# Patient Record
Sex: FEMALE | Race: WHITE
[De-identification: names, ages, dates, MRNs, and addresses within clinical notes are randomized per-mention and may not be internally consistent; named-entity substitution may affect disease eponyms.]

---

## 2017-02-07 ENCOUNTER — HOSPITAL ENCOUNTER (OUTPATIENT)
Dept: HOSPITAL 62 - RAD | Age: 72
End: 2017-02-07
Attending: NURSE PRACTITIONER
Payer: MEDICARE

## 2017-02-07 DIAGNOSIS — R10.9: Primary | ICD-10-CM

## 2017-02-07 PROCEDURE — 76700 US EXAM ABDOM COMPLETE: CPT

## 2017-02-16 ENCOUNTER — HOSPITAL ENCOUNTER (INPATIENT)
Dept: HOSPITAL 62 - ER | Age: 72
LOS: 8 days | Discharge: NURSING, REHAB OR LONG-TERM CARE | DRG: 543 | End: 2017-02-24
Attending: INTERNAL MEDICINE | Admitting: INTERNAL MEDICINE
Payer: MEDICARE

## 2017-02-16 DIAGNOSIS — M54.5: ICD-10-CM

## 2017-02-16 DIAGNOSIS — E78.5: ICD-10-CM

## 2017-02-16 DIAGNOSIS — I25.10: ICD-10-CM
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
## 2017-02-16 DIAGNOSIS — Z86.711: ICD-10-CM

## 2017-02-16 DIAGNOSIS — K21.9: ICD-10-CM

## 2017-02-16 DIAGNOSIS — G89.4: ICD-10-CM

## 2017-02-16 DIAGNOSIS — F11.90: ICD-10-CM
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
## 2017-02-16 DIAGNOSIS — I82.502: ICD-10-CM

## 2017-02-16 DIAGNOSIS — R58: ICD-10-CM

## 2017-02-16 DIAGNOSIS — I10: ICD-10-CM

## 2017-02-16 DIAGNOSIS — Z88.0: ICD-10-CM

## 2017-02-16 DIAGNOSIS — M84.48XA: Primary | ICD-10-CM

## 2017-02-16 DIAGNOSIS — Z79.01: ICD-10-CM

## 2017-02-16 DIAGNOSIS — E11.9: ICD-10-CM

## 2017-02-16 LAB
ALBUMIN SERPL-MCNC: 3.9 G/DL (ref 3.5–5)
ALP SERPL-CCNC: 68 U/L (ref 38–126)
ALT SERPL-CCNC: 24 U/L (ref 9–52)
ANION GAP SERPL CALC-SCNC: 9 MMOL/L (ref 5–19)
APTT BLD: 86.4 SEC (ref 23.5–35.8)
AST SERPL-CCNC: 31 U/L (ref 14–36)
BASOPHILS # BLD AUTO: 0.1 10^3/UL (ref 0–0.2)
BASOPHILS NFR BLD AUTO: 0.9 % (ref 0–2)
BILIRUB DIRECT SERPL-MCNC: 0 MG/DL (ref 0–0.3)
BILIRUB SERPL-MCNC: 0.6 MG/DL (ref 0.2–1.3)
BUN SERPL-MCNC: 17 MG/DL (ref 7–20)
CALCIUM: 9.3 MG/DL (ref 8.4–10.2)
CHLORIDE SERPL-SCNC: 100 MMOL/L (ref 98–107)
CO2 SERPL-SCNC: 33 MMOL/L (ref 22–30)
CREAT SERPL-MCNC: 0.74 MG/DL (ref 0.52–1.25)
EOSINOPHIL # BLD AUTO: 0.2 10^3/UL (ref 0–0.6)
EOSINOPHIL NFR BLD AUTO: 2.2 % (ref 0–6)
ERYTHROCYTE [DISTWIDTH] IN BLOOD BY AUTOMATED COUNT: 16 % (ref 11.5–14)
GLUCOSE SERPL-MCNC: 177 MG/DL (ref 75–110)
HCT VFR BLD CALC: 36.7 % (ref 36–47)
HGB BLD-MCNC: 12.3 G/DL (ref 12–15.5)
HGB HCT DIFFERENCE: 0.2
LYMPHOCYTES # BLD AUTO: 1.8 10^3/UL (ref 0.5–4.7)
LYMPHOCYTES NFR BLD AUTO: 19.5 % (ref 13–45)
MCH RBC QN AUTO: 31.4 PG (ref 27–33.4)
MCHC RBC AUTO-ENTMCNC: 33.5 G/DL (ref 32–36)
MCV RBC AUTO: 94 FL (ref 80–97)
MONOCYTES # BLD AUTO: 0.9 10^3/UL (ref 0.1–1.4)
MONOCYTES NFR BLD AUTO: 9.7 % (ref 3–13)
NEUTROPHILS # BLD AUTO: 6.4 10^3/UL (ref 1.7–8.2)
NEUTS SEG NFR BLD AUTO: 67.7 % (ref 42–78)
POTASSIUM SERPL-SCNC: 3.5 MMOL/L (ref 3.6–5)
PROT SERPL-MCNC: 6.2 G/DL (ref 6.3–8.2)
PROTHROMBIN TIME: 62.6 SEC (ref 11.4–15.4)
PROTHROMBIN TIME: 67.4 SEC (ref 11.4–15.4)
RBC # BLD AUTO: 3.92 10^6/UL (ref 3.72–5.28)
SODIUM SERPL-SCNC: 142 MMOL/L (ref 137–145)
WBC # BLD AUTO: 9.4 10^3/UL (ref 4–10.5)

## 2017-02-16 PROCEDURE — 85027 COMPLETE CBC AUTOMATED: CPT

## 2017-02-16 PROCEDURE — 85730 THROMBOPLASTIN TIME PARTIAL: CPT

## 2017-02-16 PROCEDURE — 99285 EMERGENCY DEPT VISIT HI MDM: CPT

## 2017-02-16 PROCEDURE — 85610 PROTHROMBIN TIME: CPT

## 2017-02-16 PROCEDURE — 80053 COMPREHEN METABOLIC PANEL: CPT

## 2017-02-16 PROCEDURE — 83735 ASSAY OF MAGNESIUM: CPT

## 2017-02-16 PROCEDURE — 82272 OCCULT BLD FECES 1-3 TESTS: CPT

## 2017-02-16 PROCEDURE — 72190 X-RAY EXAM OF PELVIS: CPT

## 2017-02-16 PROCEDURE — 82962 GLUCOSE BLOOD TEST: CPT

## 2017-02-16 PROCEDURE — 96374 THER/PROPH/DIAG INJ IV PUSH: CPT

## 2017-02-16 PROCEDURE — 85025 COMPLETE CBC W/AUTO DIFF WBC: CPT

## 2017-02-16 PROCEDURE — 36415 COLL VENOUS BLD VENIPUNCTURE: CPT

## 2017-02-16 PROCEDURE — 81001 URINALYSIS AUTO W/SCOPE: CPT

## 2017-02-16 PROCEDURE — 96375 TX/PRO/DX INJ NEW DRUG ADDON: CPT

## 2017-02-16 PROCEDURE — 72148 MRI LUMBAR SPINE W/O DYE: CPT

## 2017-02-16 RX ADMIN — POTASSIUM CHLORIDE SCH MEQ: 750 TABLET, FILM COATED, EXTENDED RELEASE ORAL at 20:51

## 2017-02-16 NOTE — ER DOCUMENT REPORT
ED Neck/Back Problem





- General


Chief Complaint: Low Back Pain


Stated Complaint: BACK PAIN


Time seen by provider: 10:55


Mode of Arrival: Medic


Information source: Patient, Emergency Med Personnel, [facility] Records


Notes: 


This 71-year-old female patient comes emergency room complaining of sacral low 

back pain radiating down the bilateral thighs.


She does have chronic low back pain and takes Norco 7.5 TID.  She last filled a 

one-month prescription 3 days ago.


She reports 2 weeks ago she fell against a wall striking it with her buttock 

area.  It was a little uncomfortable but not too much of a problem at that time.





3-4 days ago she reports her back "seized up", she began having increasing pain 

across the lower part of the sacral back and noted pain going down both lateral 

thighs but did not go below the knees.  She has been in bed the last 3 days.  

She states it is too painful to try to sit up, roll onto her side or stand for 

the last 3 days.  There is no bowel or bladder problem.


TRAVEL OUTSIDE OF THE [place] IN LAST 30 DAYS: No





- Related Data


Allergies/Adverse Reactions: 


 





Penicillins Allergy (Verified 02/16/17 09:54)


 











Past Medical History





- General


Information source: Patient, Emergency Med Personnel, [facility] Records





- Social History


Smoking Status: Never Smoker


Cigarette use (# per day): No


Chew tobacco use (# tins/day): No


Smoking Education Provided: No


Frequency of alcohol use: None


Drug Abuse: None


Occupation: retired


Lives with: Family


Family History: Reviewed & Not Pertinent


Patient has suicidal ideation: No


Patient has homicidal ideation: No





- Past Medical History


Cardiac Medical History: Reports: Hx Coronary Artery Disease - Patient was 

noted to have coronary artery calcifications on a CT scan 2015, Hx DVT, Hx 

Hypercholesterolemia, Hx Hypertension, Hx Pulmonary Embolism


Pulmonary Medical History: Reports: None


EENT Medical History: Reports: None


Neurological Medical History: Reports: None


Endocrine Medical History: Reports: Hx Diabetes Mellitus Type 2


Renal/ Medical History: Reports: None


GI Medical History: Reports: Hx Gastroesophageal Reflux Disease, Hx Ulcer - 

Peptic ulcer disease treated with surgery


Musculoskeltal Medical History: Reports Other - Osteoporosis


Psychiatric Medical History: Reports: None


Past Surgical History: Reports: Hx Abdominal Surgery - Gastric surgery for 

peptic ulcer disease, Hx Cholecystectomy, Hx Hysterectomy, Hx Vascular Surgery 

- IVC filter, Other - Cataract surgery.





- Immunizations


Hx Pneumococcal Vaccination: 11/01/13





Review of Systems





- Review of Systems


Constitutional: No symptoms reported


EENT: No symptoms reported


Cardiovascular: No symptoms reported


Respiratory: No symptoms reported


Gastrointestinal: No symptoms reported


Genitourinary: No symptoms reported


Female Genitourinary: Post menopausal


Musculoskeletal: See HPI, Back pain


Skin: No symptoms reported


Hematologic/Lymphatic: No symptoms reported


Neurological/Psychological: No symptoms reported





Physical Exam





- Vital signs


Vitals: 


 











Temp Pulse Resp BP Pulse Ox


 


 99.2 F   68   18   130/50 H  96 


 


 02/16/17 09:30  02/16/17 09:30  02/16/17 09:30  02/16/17 09:30  02/16/17 09:30














- General


General appearance: Appears well, Alert


In distress: None





- HEENT


Head: Normocephalic, Atraumatic


Eyes: Normal


Pupils: PERRL


Neck: Normal





- Respiratory


Respiratory status: No respiratory distress


Breath sounds: Normal





- Cardiovascular


Rhythm: Regular


Heart sounds: Normal auscultation


Murmur: No





- Abdominal


Inspection: Obese


Bowel sounds: Normal


Tenderness: Nontender





- Back


Back: Normal, Other - There is no tenderness to palpate the lumbar and sacral 

back over the bones or the muscles.  There is severe pain when the patient 

attempts to sit up or roll from one side to the other, but this cannot be 

palpated.





- Extremities


General upper extremity: Normal inspection


General lower extremity: Other - There is tenderness to palpate the lateral 

thighs, right a little worse than the left.  There is no pain with manipulation 

of the knees or hips.





- Neurological


Neuro grossly intact: Yes





- Psychological


Associated symptoms: Normal affect, Normal mood





- Skin


Skin Temperature: Warm


Skin Moisture: Dry


Skin Color: Normal





Course





- Vital Signs


Vital signs: 


 











Temp Pulse Resp BP Pulse Ox


 


 99.7 F   68   18   130/50 H  96 


 


 02/16/17 14:40  02/16/17 09:30  02/16/17 09:30  02/16/17 09:30  02/16/17 09:30














- Laboratory


Result Diagrams: 


 02/16/17 12:20





 02/16/17 12:20


Laboratory results interpreted by me: 


 











  02/16/17 02/16/17 02/16/17





  12:20 12:20 14:38


 


RDW  16.0 H  


 


PT    67.4 H*


 


INR    7.46 H*


 


Potassium   3.5 L 


 


Carbon Dioxide   33 H 


 


Glucose   177 H 


 


Total Protein   6.2 L 














- Diagnostic Test


Radiology reviewed: Reports reviewed - MRI of the low back shows a nondisplaced 

transverse sacral insufficiency fracture to the S3 region.  There is also edema 

going into the right left sacral wings.  There is hemorrhage along the 

piriformis and iliacus muscles.





- Consults


  ** Dr. Gannon


Time consulted: 15:20





Discharge





- Discharge


Clinical Impression: 


 Excessive anticoagulation





Sacral insufficiency fracture


Qualifiers:


 Encounter type: initial encounter Qualified Code(s): M84.48XA - Pathological 

fracture, other site, initial encounter for fracture





Condition: Stable


Disposition: ADMITTED AS INPATIENT


Admitting Provider: Luis


Unit Admitted: Telemetry


Referrals: 


RADHA GANNON MD [Primary Care Provider] - Follow up as needed

## 2017-02-16 NOTE — PDOC H&P
History of Present Illness


Admission Date/PCP: 


  02/16/17 17:04





  RADHA GANNON





Patient complains of: Low back pain


History of Present Illness: 


TOOTIE CASTILLO is a 71 year old female brought to the ED by medic due to low 

back pain and difficulty with ambulation. Patient reported pasing out about 3 

weeks ago while standing at her toilet sink and sustained injury to her 

buttock. She reported no significant pain more than her usual pack pain that 

she has been on chronic opiate therapy for awhile. Patient reported that over 

the last 3 days she experience more muscle spasms in her lower back and her 

pain subsequently became very severe and radiating into her thigh bilaterally. 

She claimed worsening pain with any significant movement, sitting, turning or 

laying on her side. Denied any recurrent fall, trauma or prior use of hard 

surface material. 


Her initial evaluation in the ED with MRI sacral region did confirm sacral 

spine fracture with surrounding tissue hemorrhage and swelling. Her INR was 

reported significantly elevated necessitating administration of Vitamin K 

therapy. Due to her excessive anticoagulation with possible subacute sacral 

fracture and ambulatory limitation she was advised hospitalization for further 

evaluation and treatment.





Past Medical History


Cardiac Medical History: Reports: Coronary Artery Disease - Patient was noted 

to have coronary artery calcifications on a CT scan 2015, DVT, Hyperlipidema, 

Hypertension, Pulmonary Embolism


Pulmonary Medical History: Reports: None


EENT Medical History: Reports: None


Neurological Medical History: Reports: None


Endocrine Medical History: Reports: Diabetes Mellitus Type 2


Renal/ Medical History: Reports: None


GI Medical History: Reports: Gastroesophageal Reflux Disease


Musculoskeltal Medical History: Reports: Other - Osteoporosis


Psychiatric Medical History: Reports: None


Hematology: Reports: Anemia





Past Surgical History


Past Surgical History: Reports: Cholecystectomy, Hysterectomy, Vascular Surgery 

- IVC filter, Other - Cataract surgery.





Social History


Lives with: Family


Smoking Status: Never Smoker


Frequency of Alcohol Use: None


Hx Recreational Drug Use: No


Hx Prescription Drug Abuse: No





- Advance Directive


Resuscitation Status: Full Code





Family History


Family History: Reviewed & Not Pertinent


Parental Family History Reviewed: Yes


Children Family History Reviewed: Yes


Sibling(s) Family History Reviewed.: Yes





Medication/Allergy


Home Medications: 








Enalapril Maleate 1 tab PO DAILY 07/31/14 


Ezetimibe [Zetia 10 mg Tablet] 10 mg PO DAILY 07/31/14 


Gabapentin [Neurontin 400 mg Capsule] 400 mg PO TID 07/31/14 


Insulin Glargine,Hum.rec.anlog [Lantus] 10 units SQ QHS 07/31/14 


Metoprolol Succinate 25 mg PO DAILY 07/31/14 


Pioglitazone HCl/Metformin HCl [Pioglitazone-Metformin ] 1 tab PO BID 07/ 31/14 


Simvastatin 40 mg PO QHS 07/31/14 


Warfarin Sodium 5 mg PO ASDIR PRN 07/31/14 


Clotrimazole/Betamethasone Dip [Clotrimazole-Betamethasone Crm] 15 gm TP BID 10/

02/15 


Ferrous Sulfate [Feosol] 650 mg PO BID 10/02/15 


Hydrocodone/Acetaminophen [Hydrocodon-Acetaminoph 7.5-325] 1 each PO Q8H PRN 10/

02/15 


Levothyroxine Sodium 25 mcg PO DAILY 10/02/15 


Nystatin [Mycostatin Topical Powder 15 gm] 1 applic TP BID PRN 10/02/15 


Pravastatin Sodium 40 mg PO DAILY 10/02/15 


Warfarin Sodium 7.5 mg PO ASDIR PRN 10/02/15 








Allergies/Adverse Reactions: 


 





Penicillins Allergy (Verified 02/16/17 09:54)


 











Review of Systems


Constitutional: ABSENT: chills, fever(s), headache(s), weight gain, weight loss


Eyes: PRESENT: visual disturbances - correction of acuity with glasses


Ears: ABSENT: hearing changes


Nose, Mouth, and Throat: ABSENT: as per HPI, headache(s), mouth pain, sore 

throat, vertigo, other


Cardiovascular: ABSENT: as per HPI, chest pain, dyspnea on exertion, edema, 

orthropnea, palpitations, other


Respiratory: ABSENT: as per HPI, cough, dyspnea, hemoptysis, sputum, other


Gastrointestinal: PRESENT: diarrhea - recently resolved.  ABSENT: as per HPI, 

abdominal pain, bloating, coffee ground emesis, constipation, dysphagia, 

heartburn, hematemesis, hematochezia, melena, nausea, vomiting, other


Musculoskeletal: PRESENT: back pain, deformity.  ABSENT: as per HPI, joint 

swelling, muscle weakness, other


Integumentary: ABSENT: as per HPI, diaphoresis, erythema, lesions, pruritus, 

rash, wounds, other


Neurological: PRESENT: frequent falls, numbness, paresthesias, tingling.  ABSENT

: as per HPI, abnormal gait, abnormal movements, abnormal speech, confusion, 

convulsions, dizziness, focal weakness, lack of coordination, memory loss, 

restless legs, syncope, tremor(s), vertigo, weakness, other


Psychiatric: ABSENT: as per HPI, anxiety, depression, hallucinations, homidical 

ideation, suicidal ideation, other


Endocrine: ABSENT: as per HPI, cold intolerance, flushing, heat intolerance, 

menstrual abnormalities, polydipsia, polyphagia, polyuria, other


Hematologic/Lymphatic: ABSENT: as per HPI, easy bleeding, easy bruising, 

lymphadenopathy, other





Physical Exam


Vital Signs: 


 











Temp Pulse Resp BP Pulse Ox


 


 99.5 F   69   16   129/50 H  96 


 


 02/16/17 18:07  02/16/17 18:07  02/16/17 18:07  02/16/17 18:07  02/16/17 18:07








 Intake & Output











 02/15/17 02/16/17 02/17/17





 06:59 06:59 06:59


 


Weight   81.3 kg











General appearance: PRESENT: no acute distress, cooperative, obese


Head exam: PRESENT: atraumatic, normocephalic


Eye exam: PRESENT: conjunctiva pink, EOMI, PERRLA


Ear exam: ABSENT: bleeding, drainage, normal external ear exam, TM's normal 

bilaterally, other


Mouth exam: ABSENT: dry mucosa, laceration, moist, neck supple, tongue midline, 

other


Teeth exam: PRESENT: poor dentation.  ABSENT: dental caries, dental tenderness, 

edentulous, other


Throat exam: ABSENT: post pharyngeal erythema, tonsillar erythema, tonsillar 

exudate, tonsillogmegaly, other


Neck exam: PRESENT: full ROM.  ABSENT: carotid bruit, JVD, lymphadenopathy, 

thyromegaly


Respiratory exam: ABSENT: accessory muscle use, chest wall tenderness, clear to 

auscultation conrad, crackles, decreased breath sounds, prolonged expiratory phas, 

rales, retraction, rhonchi, stridor, symmetrical, tachypnea, unlabored, wheezes

, other


Cardiovascular exam: PRESENT: systolic murmur.  ABSENT: bradycardia, clicks, 

diastolic murmur, gallop, irregular rhythm, RRR, rubs, +S1, +S2, tachycardia, 

other


Murmur grade: 3


Pulses: PRESENT: normal dorsalis pedis pul, +2 pedal pulses bilateral


Vascular exam: PRESENT: normal capillary refill


GI/Abdominal exam: PRESENT: normal bowel sounds, soft.  ABSENT: distended, 

guarding, mass, organolmegaly, rebound, tenderness


Rectal exam: PRESENT: deferred


Extremities exam: PRESENT: full ROM


Musculoskeletal exam: PRESENT: deformity - related to joint involvement with 

arthritis, tenderness - lumbosacral spine region. No hematoma or palpable mass.


Neurological exam: PRESENT: alert, awake, oriented to person, oriented to place

, oriented to time, oriented to situation, CN II-XII grossly intact.  ABSENT: 

motor sensory deficit


Psychiatric exam: PRESENT: appropriate affect, normal mood.  ABSENT: homicidal 

ideation, suicidal ideation


Skin exam: PRESENT: dry, intact, warm.  ABSENT: cyanosis, rash





Results


Laboratory Results: 


see [employer] for details. These were reviewed and form part of my medical 

decision making.


Impressions: 


 





Pelvis X-Ray  02/16/17 11:18


IMPRESSION:  No acute fracture or dislocation identified.  Severe degenerative 

changes noted in both hips.  Degenerative changes noted in the pubic symphysis 

and lumbar spine


 








Lumbar Spine MRI  02/16/17 12:39


IMPRESSION:  Acute/early subacute fractures of the sacrum with  edema and 

hemorrhage in the adjacent tissues and muscles as above.  Report discussed with 

the emergency room attending physician


 














Assessment & Plan





- Diagnosis


(1) Chronic prescription opiate use


Is this a current diagnosis for this admission?: YesPlan: 


see admitting physician orders.








(2) Excessive anticoagulation


Is this a current diagnosis for this admission?: YesPlan: 


see admitting physician orders.








(3) Sacral insufficiency fracture


Qualifiers: 


     Encounter type: initial encounter     Qualified Code(s): M84.48XA - 

Pathological fracture, other site, initial encounter for fracture  


Is this a current diagnosis for this admission?: YesPlan: 


see admitting physician orders.








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


Qualifiers: 


     Laterality: unspecified laterality        Qualified Code(s): I82.5Z9 - 

Chronic embolism and thrombosis of unspecified deep veins of unspecified distal 

lower extremity  


Is this a current diagnosis for this admission?: YesPlan: 


see admitting physician orders.








(5) Chronic pain syndrome


Is this a current diagnosis for this admission?: YesPlan: 


see admitting physician orders.








(6) GERD (gastroesophageal reflux disease)


Qualifiers: 


     Esophagitis presence: without esophagitis        Qualified Code(s): K21.9 

- Gastro-esophageal reflux disease without esophagitis  


Is this a current diagnosis for this admission?: YesPlan: 


see admitting physician orders.








(7) HLD (hyperlipidemia)


Qualifiers: 


     Hyperlipidemia type: pure hypercholesterolemia        Qualified Code(s): 

E78.00 - Pure hypercholesterolemia, unspecified; E78.0 - Pure 

hypercholesterolemia  


Is this a current diagnosis for this admission?: YesPlan: 


see admitting physician orders.








(8) HTN (hypertension)


Qualifiers: 


     Hypertension type: essential hypertension        Qualified Code(s): I10 - 

Essential (primary) hypertension  


Is this a current diagnosis for this admission?: YesPlan: 


see admitting physician orders.








(9) Leg DVT (deep venous thromboembolism), chronic


Qualifiers: 


     Laterality: unspecified laterality        Qualified Code(s): I82.509 - 

Chronic embolism and thrombosis of unspecified deep veins of unspecified lower 

extremity  


Is this a current diagnosis for this admission?: YesPlan: 


see admitting physician orders.








(10) Fall at home


Qualifiers: 


     Encounter type: initial encounter        Qualified Code(s): W19.XXXA - 

Unspecified fall, initial encounter; Y92.099 - Unspecified place in other non-

institutional residence as the place of occurrence of the external cause  


Is this a current diagnosis for this admission?: YesPlan: 


see admitting physician orders.











- Time


Time Spent: 50 to 70 Minutes


Medications reviewed and adjusted accordingly: Yes


Anticipated discharge: Home with Homehealth


Within: Other





- Inpatient Certification


Based on my medical assessment, after consideration of the patient's 

comorbidities, presenting symptoms, or acuity I expect that the services needed 

warrant INPATIENT care.: Yes


I certify that my determination is in accordance with my understanding of 

Medicare's requirements for reasonable and necessary INPATIENT services [42 CFR 

412.3e].: Yes


Medical Necessity: Need Close Monitoring Due to Risk of Patient Decompensation, 

Need For Continuous Telemetry Monitoring, Need for Pain Control, Risk of 

Complication if Not Cared For in Hospital


Post Hospital Care: D/C Planner Documentation





- Plan Summary


Plan Summary: 


see admitting physician orders.

## 2017-02-17 LAB
ALBUMIN SERPL-MCNC: 3.1 G/DL (ref 3.5–5)
ALP SERPL-CCNC: 58 U/L (ref 38–126)
ALT SERPL-CCNC: 21 U/L (ref 9–52)
ANION GAP SERPL CALC-SCNC: 8 MMOL/L (ref 5–19)
APPEARANCE UR: (no result)
AST SERPL-CCNC: 26 U/L (ref 14–36)
BASOPHILS # BLD AUTO: 0.1 10^3/UL (ref 0–0.2)
BASOPHILS NFR BLD AUTO: 0.9 % (ref 0–2)
BILIRUB DIRECT SERPL-MCNC: 0 MG/DL (ref 0–0.3)
BILIRUB SERPL-MCNC: 0.5 MG/DL (ref 0.2–1.3)
BILIRUB UR QL STRIP: NEGATIVE
BUN SERPL-MCNC: 20 MG/DL (ref 7–20)
CALCIUM: 9.5 MG/DL (ref 8.4–10.2)
CHLORIDE SERPL-SCNC: 101 MMOL/L (ref 98–107)
CO2 SERPL-SCNC: 32 MMOL/L (ref 22–30)
CREAT SERPL-MCNC: 0.84 MG/DL (ref 0.52–1.25)
EOSINOPHIL # BLD AUTO: 0.3 10^3/UL (ref 0–0.6)
EOSINOPHIL NFR BLD AUTO: 3.5 % (ref 0–6)
ERYTHROCYTE [DISTWIDTH] IN BLOOD BY AUTOMATED COUNT: 16.3 % (ref 11.5–14)
GLUCOSE SERPL-MCNC: 161 MG/DL (ref 75–110)
GLUCOSE UR STRIP-MCNC: 50 MG/DL
HCT VFR BLD CALC: 35.6 % (ref 36–47)
HGB BLD-MCNC: 11.7 G/DL (ref 12–15.5)
HGB HCT DIFFERENCE: -0.5
KETONES UR STRIP-MCNC: 20 MG/DL
LYMPHOCYTES # BLD AUTO: 1.9 10^3/UL (ref 0.5–4.7)
LYMPHOCYTES NFR BLD AUTO: 22.8 % (ref 13–45)
MCH RBC QN AUTO: 31 PG (ref 27–33.4)
MCHC RBC AUTO-ENTMCNC: 32.8 G/DL (ref 32–36)
MCV RBC AUTO: 95 FL (ref 80–97)
MONOCYTES # BLD AUTO: 1 10^3/UL (ref 0.1–1.4)
MONOCYTES NFR BLD AUTO: 12.2 % (ref 3–13)
NEUTROPHILS # BLD AUTO: 5 10^3/UL (ref 1.7–8.2)
NEUTS SEG NFR BLD AUTO: 60.6 % (ref 42–78)
NITRITE UR QL STRIP: NEGATIVE
PH UR STRIP: 6 [PH] (ref 5–9)
POTASSIUM SERPL-SCNC: 4.9 MMOL/L (ref 3.6–5)
PROT SERPL-MCNC: 6.1 G/DL (ref 6.3–8.2)
PROT UR STRIP-MCNC: NEGATIVE MG/DL
PROTHROMBIN TIME: 28.3 SEC (ref 11.4–15.4)
PROTHROMBIN TIME: 50.7 SEC (ref 11.4–15.4)
RBC # BLD AUTO: 3.76 10^6/UL (ref 3.72–5.28)
SODIUM SERPL-SCNC: 140.7 MMOL/L (ref 137–145)
SP GR UR STRIP: 1.02
UROBILINOGEN UR-MCNC: NEGATIVE MG/DL (ref ?–2)
WBC # BLD AUTO: 8.3 10^3/UL (ref 4–10.5)

## 2017-02-17 RX ADMIN — INSULIN LISPRO PRN UNIT: 100 INJECTION, SOLUTION INTRAVENOUS; SUBCUTANEOUS at 23:07

## 2017-02-17 RX ADMIN — INSULIN LISPRO PRN UNIT: 100 INJECTION, SOLUTION INTRAVENOUS; SUBCUTANEOUS at 11:56

## 2017-02-17 RX ADMIN — POTASSIUM CHLORIDE SCH MEQ: 750 TABLET, FILM COATED, EXTENDED RELEASE ORAL at 00:08

## 2017-02-17 RX ADMIN — FERROUS SULFATE TAB 325 MG (65 MG ELEMENTAL FE) SCH MG: 325 (65 FE) TAB at 18:07

## 2017-02-17 RX ADMIN — MORPHINE SULFATE PRN MG: 10 INJECTION INTRAMUSCULAR; INTRAVENOUS; SUBCUTANEOUS at 09:07

## 2017-02-17 RX ADMIN — GABAPENTIN SCH MG: 400 CAPSULE ORAL at 18:07

## 2017-02-17 RX ADMIN — ATORVASTATIN CALCIUM SCH MG: 10 TABLET, FILM COATED ORAL at 23:08

## 2017-02-17 RX ADMIN — METFORMIN HYDROCHLORIDE SCH MG: 850 TABLET, FILM COATED ORAL at 18:07

## 2017-02-17 RX ADMIN — PIOGLITAZONE SCH MG: 15 TABLET ORAL at 18:07

## 2017-02-17 RX ADMIN — INSULIN GLARGINE SCH UNIT: 100 INJECTION, SOLUTION SUBCUTANEOUS at 23:07

## 2017-02-17 RX ADMIN — INSULIN LISPRO PRN UNIT: 100 INJECTION, SOLUTION INTRAVENOUS; SUBCUTANEOUS at 09:07

## 2017-02-17 RX ADMIN — LANSOPRAZOLE SCH MG: 30 TABLET, ORALLY DISINTEGRATING, DELAYED RELEASE ORAL at 07:12

## 2017-02-17 RX ADMIN — INSULIN LISPRO PRN UNIT: 100 INJECTION, SOLUTION INTRAVENOUS; SUBCUTANEOUS at 00:08

## 2017-02-17 RX ADMIN — GABAPENTIN SCH MG: 400 CAPSULE ORAL at 23:08

## 2017-02-17 NOTE — PDOC PROGRESS REPORT
Subjective


Progress Note for:: 02/17/17


Subjective:: 


Patient reported fairly satisfactory control of her pain with her been in 

supine position and on IV Morphine therapy. Her INR remain elevated but no 

active bleeding. She denied chest pain, difficulty with breathing, fever or 

chills. No nausea or vomiting. Tolerating oral feeding.





Physical Exam


Vital Signs: 


 











Temp Pulse Resp BP Pulse Ox


 


 98.9 F   61   14   114/40 L  93 


 


 02/17/17 11:26  02/17/17 14:00  02/17/17 11:26  02/17/17 11:26  02/17/17 11:26








 Intake & Output











 02/16/17 02/17/17 02/18/17





 06:59 06:59 06:59


 


Intake Total  100 


 


Balance  100 











General appearance: PRESENT: no acute distress, cooperative, obese


Eye exam: PRESENT: conjunctiva pink, EOMI, PERRLA


Mouth exam: PRESENT: moist


Neck exam: PRESENT: full ROM.  ABSENT: carotid bruit, JVD, lymphadenopathy, 

thyromegaly


Respiratory exam: ABSENT: accessory muscle use, chest wall tenderness, clear to 

auscultation conrad, crackles, decreased breath sounds, prolonged expiratory phas, 

rales, retraction, rhonchi, stridor, symmetrical, tachypnea, unlabored, wheezes

, other


Cardiovascular exam: PRESENT: RRR, systolic murmur.  ABSENT: bradycardia, clicks

, diastolic murmur, gallop, irregular rhythm, rubs, +S1, +S2, tachycardia, other


Murmur grade: 3


GI/Abdominal exam: PRESENT: normal bowel sounds, soft.  ABSENT: distended, 

guarding, mass, organolmegaly, rebound, tenderness


Extremities exam: PRESENT: full ROM


Musculoskeletal exam: PRESENT: deformity - from joint involved arthritis


Neurological exam: PRESENT: alert, awake, oriented to person, oriented to place

, oriented to time, oriented to situation, CN II-XII grossly intact.  ABSENT: 

motor sensory deficit


Psychiatric exam: PRESENT: appropriate affect, normal mood.  ABSENT: homicidal 

ideation, suicidal ideation


Skin exam: PRESENT: dry, intact, warm.  ABSENT: cyanosis, rash





Results


Laboratory Results: 


 





 02/17/17 03:41 





 02/17/17 03:41 





 











  02/17/17 02/17/17





  03:41 03:41


 


WBC  8.3 


 


RBC  3.76 


 


Hgb  11.7 L 


 


Hct  35.6 L 


 


MCV  95 


 


MCH  31.0 


 


MCHC  32.8 


 


RDW  16.3 H 


 


Plt Count  277 


 


Seg Neutrophils %  60.6 


 


Lymphocytes %  22.8 


 


Monocytes %  12.2 


 


Eosinophils %  3.5 


 


Basophils %  0.9 


 


Absolute Neutrophils  5.0 


 


Absolute Lymphocytes  1.9 


 


Absolute Monocytes  1.0 


 


Absolute Eosinophils  0.3 


 


Absolute Basophils  0.1 


 


Sodium   140.7


 


Potassium   4.9  D


 


Chloride   101


 


Carbon Dioxide   32 H


 


Anion Gap   8


 


BUN   20


 


Creatinine   0.84


 


Est GFR ( Amer)   > 60


 


Est GFR (Non-Af Amer)   > 60


 


Glucose   161 H


 


Calcium   9.5


 


Total Bilirubin   0.5


 


AST   26


 


ALT   21


 


Alkaline Phosphatase   58


 


Total Protein   6.1 L


 


Albumin   3.1 L











Impressions: 


 





Pelvis X-Ray  02/16/17 11:18


IMPRESSION:  No acute fracture or dislocation identified.  Severe degenerative 

changes noted in both hips.  Degenerative changes noted in the pubic symphysis 

and lumbar spine


 








Lumbar Spine MRI  02/16/17 12:39


IMPRESSION:  Acute/early subacute fractures of the sacrum with  edema and 

hemorrhage in the adjacent tissues and muscles as above.  Report discussed with 

the emergency room attending physician


 














Assessment & Plan





- Diagnosis


(1) Chronic prescription opiate use


Is this a current diagnosis for this admission?: YesPlan: 





see attending physician orders.








(2) Excessive anticoagulation


Is this a current diagnosis for this admission?: YesPlan: 





see attending physician orders. We will continue to hold Warfarin and monitor 

INR response.








(3) Sacral insufficiency fracture


Qualifiers: 


     Encounter type: initial encounter     Qualified Code(s): M84.48XA - 

Pathological fracture, other site, initial encounter for fracture  


Is this a current diagnosis for this admission?: YesPlan: 





See attending physician orders. I will request input of orthopod in her 

management although there may be no surgical intervention but conservative 

medical management for her minimally displaced sacral insufficiency fracture.








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


Qualifiers: 


     Laterality: unspecified laterality        Qualified Code(s): I82.5Z9 - 

Chronic embolism and thrombosis of unspecified deep veins of unspecified distal 

lower extremity  


Is this a current diagnosis for this admission?: YesPlan: 





see attending physician orders.








(5) Chronic pain syndrome


Is this a current diagnosis for this admission?: YesPlan: 





see attending physician orders.








(6) GERD (gastroesophageal reflux disease)


Qualifiers: 


     Esophagitis presence: without esophagitis        Qualified Code(s): K21.9 

- Gastro-esophageal reflux disease without esophagitis  


Is this a current diagnosis for this admission?: YesPlan: 





see attending physician orders.








(7) HLD (hyperlipidemia)


Qualifiers: 


     Hyperlipidemia type: pure hypercholesterolemia        Qualified Code(s): 

E78.00 - Pure hypercholesterolemia, unspecified; E78.0 - Pure 

hypercholesterolemia  


Is this a current diagnosis for this admission?: YesPlan: 





see attending physician orders.








(8) HTN (hypertension)


Qualifiers: 


     Hypertension type: essential hypertension        Qualified Code(s): I10 - 

Essential (primary) hypertension  


Is this a current diagnosis for this admission?: YesPlan: 





see attending physician orders.








(9) Leg DVT (deep venous thromboembolism), chronic


Qualifiers: 


     Laterality: unspecified laterality        Qualified Code(s): I82.509 - 

Chronic embolism and thrombosis of unspecified deep veins of unspecified lower 

extremity  


Is this a current diagnosis for this admission?: YesPlan: 





see attending physician orders.








(10) Fall at home


Qualifiers: 


     Encounter type: initial encounter        Qualified Code(s): W19.XXXA - 

Unspecified fall, initial encounter; Y92.099 - Unspecified place in other non-

institutional residence as the place of occurrence of the external cause  


Is this a current diagnosis for this admission?: YesPlan: 





see attending physician orders. I will request PT evaluation for ambulatory 

safety. 











- Time


Time Spent with patient: 25-34 minutes


Medications reviewed and adjusted accordingly: Yes


Anticipated discharge: Home with Homehealth





- Inpatient Certification


Based on my medical assessment, after consideration of the patient's 

comorbidities, presenting symptoms, or acuity I expect that the services needed 

warrant INPATIENT care.: Yes


I certify that my determination is in accordance with my understanding of 

Medicare's requirements for reasonable and necessary INPATIENT services [42 CFR 

412.3e].: Yes


Medical Necessity: Need Close Monitoring Due to Risk of Patient Decompensation, 

Need For Continuous Telemetry Monitoring, Need for Pain Control, Risk of 

Complication if Not Cared For in Hospital


Post Hospital Care: D/C Planner Documentation





- Plan Summary


Plan Summary: 


In view of her excessive anticoagulation status and recent fall with sacral 

fracture and surrounding soft tissue hemorrhagic bleed I will continue in 

patient management. I will request for orthopod input but continue medial 

management.

## 2017-02-18 LAB — PROTHROMBIN TIME: 22.8 SEC (ref 11.4–15.4)

## 2017-02-18 RX ADMIN — OMEGA-3-ACID ETHYL ESTERS SCH GM: 900 CAPSULE ORAL at 09:24

## 2017-02-18 RX ADMIN — INSULIN LISPRO PRN UNIT: 100 INJECTION, SOLUTION INTRAVENOUS; SUBCUTANEOUS at 11:57

## 2017-02-18 RX ADMIN — PIOGLITAZONE SCH MG: 15 TABLET ORAL at 09:24

## 2017-02-18 RX ADMIN — ATORVASTATIN CALCIUM SCH MG: 10 TABLET, FILM COATED ORAL at 21:53

## 2017-02-18 RX ADMIN — FERROUS SULFATE TAB 325 MG (65 MG ELEMENTAL FE) SCH MG: 325 (65 FE) TAB at 10:00

## 2017-02-18 RX ADMIN — MULTIVITAMIN TABLET SCH TAB: TABLET at 09:25

## 2017-02-18 RX ADMIN — PIOGLITAZONE SCH MG: 15 TABLET ORAL at 18:12

## 2017-02-18 RX ADMIN — LANSOPRAZOLE SCH MG: 30 TABLET, ORALLY DISINTEGRATING, DELAYED RELEASE ORAL at 05:56

## 2017-02-18 RX ADMIN — GABAPENTIN SCH MG: 400 CAPSULE ORAL at 09:24

## 2017-02-18 RX ADMIN — GABAPENTIN SCH MG: 400 CAPSULE ORAL at 18:11

## 2017-02-18 RX ADMIN — GABAPENTIN SCH MG: 400 CAPSULE ORAL at 21:53

## 2017-02-18 RX ADMIN — METFORMIN HYDROCHLORIDE SCH MG: 850 TABLET, FILM COATED ORAL at 18:12

## 2017-02-18 RX ADMIN — FERROUS SULFATE TAB 325 MG (65 MG ELEMENTAL FE) SCH MG: 325 (65 FE) TAB at 18:11

## 2017-02-18 RX ADMIN — LEVOTHYROXINE SODIUM SCH MG: 25 TABLET ORAL at 09:25

## 2017-02-18 RX ADMIN — MORPHINE SULFATE PRN MG: 10 INJECTION INTRAMUSCULAR; INTRAVENOUS; SUBCUTANEOUS at 09:33

## 2017-02-18 RX ADMIN — MAGNESIUM OXIDE TAB 400 MG (241.3 MG ELEMENTAL MG) SCH MG: 400 (241.3 MG) TAB at 09:25

## 2017-02-18 RX ADMIN — MORPHINE SULFATE PRN MG: 10 INJECTION INTRAMUSCULAR; INTRAVENOUS; SUBCUTANEOUS at 18:12

## 2017-02-18 RX ADMIN — METFORMIN HYDROCHLORIDE SCH MG: 850 TABLET, FILM COATED ORAL at 09:24

## 2017-02-18 RX ADMIN — DEXAMETHASONE SODIUM PHOSPHATE PRN MG: 10 INJECTION INTRAMUSCULAR; INTRAVENOUS at 21:54

## 2017-02-18 RX ADMIN — GABAPENTIN SCH MG: 400 CAPSULE ORAL at 14:18

## 2017-02-18 RX ADMIN — GABAPENTIN SCH MG: 400 CAPSULE ORAL at 05:56

## 2017-02-18 RX ADMIN — METOPROLOL SUCCINATE SCH MG: 25 TABLET, EXTENDED RELEASE ORAL at 09:26

## 2017-02-18 RX ADMIN — INSULIN GLARGINE SCH UNIT: 100 INJECTION, SOLUTION SUBCUTANEOUS at 22:03

## 2017-02-18 RX ADMIN — MORPHINE SULFATE PRN MG: 10 INJECTION INTRAMUSCULAR; INTRAVENOUS; SUBCUTANEOUS at 21:54

## 2017-02-18 RX ADMIN — GABAPENTIN SCH MG: 400 CAPSULE ORAL at 02:44

## 2017-02-18 NOTE — PDOC CONSULTATION
Consultation


Consult Date: 02/18/17


Consult reason:: Pelvic pain





History of Present Illness


Admission Date/PCP: 


  02/16/17 19:17





  RADHA GANNON





History of Present Illness: 


The patient is a 71-year-old white female with a very complicated past medical 

history who is in the usual state of health until approximately 2 weeks ago 

when she fell backwards while pressing her teeth into a bathroom wall and 

subsequently breaking the wall.  Her son helped her up from this episode.  At 

that point she had some pain, but over the course last several days the pain 

has become dramatically worse.  She was evaluated in the emergency room where 

an MRI scan demonstrated the presence of a sacral insufficiency fracture.  The 

patient is admitted to Dr. COOPER Robert.  Kylee maddox is consult at for fracture 

management.





Past Medical History


Cardiac Medical History: Reports: Coronary Artery Disease - Patient was noted 

to have coronary artery calcifications on a CT scan 2015, DVT, Hyperlipidema, 

Hypertension, Pulmonary Embolism


Pulmonary Medical History: Reports: None


EENT Medical History: Reports: None


Neurological Medical History: Reports: None


Endocrine Medical History: Reports: Diabetes Mellitus Type 2


Renal/ Medical History: Reports: None


GI Medical History: Reports: Gastroesophageal Reflux Disease


Musculoskeltal Medical History: Reports: Other - Osteoporosis


Psychiatric Medical History: Reports: None


Hematology: Reports: Anemia





Past Surgical History


Past Surgical History: Reports: Cholecystectomy, Hysterectomy, Vascular Surgery 

- IVC filter, Other - Cataract surgery.





Social History


Information Source: Patient, [facility] Records


Lives with: Family


Smoking Status: Never Smoker


Frequency of Alcohol Use: None


Hx Recreational Drug Use: No


Hx Prescription Drug Abuse: No





- Advance Directive


Resuscitation Status: Full Code





Family History


Family History: Reviewed & Not Pertinent


Parental Family History Reviewed: No


Children Family History Reviewed: No


Sibling(s) Family History Reviewed.: No





Medication/Allergy


Home Medications: 








Ergocalciferol (Vitamin D2) [Vitamin D2] 50,000 units PO Q7D 02/16/17 


Ferrous Sulfate [Feosol] 650 mg PO BID 02/16/17 


Fish Oil/Dha/Epa [Fish Oil 1,200 mg Fish Oil] 2 cap PO DAILY 02/16/17 


Gabapentin [Neurontin 400 mg Capsule] 400 mg PO Q4 02/16/17 


Hydrocodone/Acetaminophen [Hydrocodon-Acetaminoph 7.5-325] 1 tab PO Q8 02/16/17 


Insulin Glargine,Hum.rec.anlog [Basaglar Kwikpen U-100] 14 units SQ QHS 02/16/ 17 


Levothyroxine Sodium [Synthroid 0.025 mg Tablet] 25 mcg PO DAILY 02/16/17 


Magnesium 500 mg PO DAILY 02/16/17 


Metoprolol Succinate 25 mg PO DAILY 02/16/17 


Multivits-Min/Iron/FA/Lutein [Centrum Silver Women Tablet] 2 tab PO BID 02/16/ 17 


Pioglitazone HCl/Metformin HCl [Pioglitazone-Metformin ] 1 tab PO BID 02/ 16/17 


Pravastatin Sodium 40 mg PO QHS 02/16/17 


Warfarin Sodium [Coumadin 5 mg Tablet] 5 mg PO [id number] 02/16/17 


Warfarin Sodium [Coumadin 7.5 mg Tablet] 7.5 mg PO Q7D 02/16/17 








Allergies/Adverse Reactions: 


 





Penicillins Allergy (Verified 02/16/17 09:54)


 











Review of Systems


All systems: as per PMH





Physical Exam


Vital Signs: 


 











Temp Pulse Resp BP Pulse Ox


 


 36.3 C   63   19   115/43 L  97 


 


 02/18/17 04:38  02/18/17 04:38  02/18/17 04:38  02/18/17 04:38  02/18/17 04:38








 Intake & Output











 02/17/17 02/18/17 02/19/17





 06:59 06:59 06:59


 


Intake Total 100 240 


 


Output Total  75 


 


Balance 100 165 











Physical Exam: 


The patient's a moderately built middle-age white female lying in bed.  She is 

alert oriented and conversive.  She is complaining of uncontrolled pain


General appearance: PRESENT: cooperative, severe distress, well-developed, well-

nourished


Head exam: PRESENT: normocephalic


Eye exam: PRESENT: EOMI


Teeth exam: PRESENT: poor dentation


Respiratory exam: PRESENT: unlabored


Cardiovascular exam: PRESENT: RRR


Murmur grade: 3


Pulses: PRESENT: +1 pedal pulses bilateral


Vascular exam: PRESENT: normal capillary refill


GI/Abdominal exam: PRESENT: soft


Rectal exam: PRESENT: deferred


Extremities exam: PRESENT: other - Passive range of motion both lower 

extremities causes significant discomfort.


Neurological exam: PRESENT: alert, awake, oriented to person, oriented to place

, oriented to time, oriented to situation, CN II-XII grossly intact.  ABSENT: 

motor sensory deficit


Skin exam: PRESENT: dry, intact, warm.  ABSENT: cyanosis, rash





Results


Laboratory Results: 


 





 02/17/17 03:41 





 02/17/17 03:41 





 











  02/17/17





  22:00


 


Urine Color  YELLOW


 


Urine Appearance  CLOUDY


 


Urine pH  6.0


 


Ur Specific Gravity  1.019


 


Urine Protein  NEGATIVE


 


Urine Glucose (UA)  50 H


 


Urine Ketones  20 H


 


Urine Blood  SMALL H


 


Urine Nitrite  NEGATIVE


 


Ur Leukocyte Esterase  LARGE H


 


Urine WBC (Auto)  21


 


Urine RBC (Auto)  5











Impressions: 


 





Pelvis X-Ray  02/16/17 11:18


IMPRESSION:  No acute fracture or dislocation identified.  Severe degenerative 

changes noted in both hips.  Degenerative changes noted in the pubic symphysis 

and lumbar spine


 








Lumbar Spine MRI  02/16/17 12:39


IMPRESSION:  Acute/early subacute fractures of the sacrum with  edema and 

hemorrhage in the adjacent tissues and muscles as above.  Report discussed with 

the emergency room attending physician


 











Status: Imported from PACS





Assessment & Plan





- Diagnosis


(1) Sacral insufficiency fracture


Qualifiers: 


     Encounter type: initial encounter     Qualified Code(s): M84.48XA - 

Pathological fracture, other site, initial encounter for fracture  


Is this a current diagnosis for this admission?: YesPlan: 


71-year-old white female with multiple ongoing medical comorbidities and now 

fall which probably in some way precipitated a sacral insufficiency fracture.  

Y there is been an acute increase in her pain over the last several days is not 

entirely clear.  This is a fracture that is not unstable and will heal on its 

own.  The patient will have discomfort for.  To 3-4 weeks as it heals and will 

have limited ability to ambulate.  As fracture healing ensues.  The patient can 

be mobilized to comfort.











- Time


Time Spent: 50 to 70 Minutes


Critical Time spent with patient: 15-24 minutes


Anticipated discharge: SNF


Within: within 48 hours

## 2017-02-18 NOTE — PDOC PROGRESS REPORT
Subjective


Progress Note for:: 02/18/17


Subjective:: 


Patient was admitted because of acute sacral fracture in the setting of 

excessive anticoagulation with warfarin





Physical Exam


Vital Signs: 


 











Temp Pulse Resp BP Pulse Ox


 


 98.7 F   69   17   112/49 L  96 


 


 02/18/17 15:31  02/18/17 15:31  02/18/17 15:31  02/18/17 15:31  02/18/17 15:31








 Intake & Output











 02/17/17 02/18/17 02/19/17





 06:59 06:59 06:59


 


Intake Total 100 240 680


 


Output Total  75 400


 


Balance 100 165 280











General appearance: PRESENT: no acute distress


Eye exam: PRESENT: PERRLA


Respiratory exam: PRESENT: rales


Cardiovascular exam: PRESENT: +S1, +S2


Murmur grade: 3


GI/Abdominal exam: PRESENT: soft


Neurological exam: PRESENT: alert





Results


Laboratory Results: 


 





 02/17/17 03:41 





 02/17/17 03:41 





 











  02/17/17





  22:00


 


Urine Color  YELLOW


 


Urine Appearance  CLOUDY


 


Urine pH  6.0


 


Ur Specific Gravity  1.019


 


Urine Protein  NEGATIVE


 


Urine Glucose (UA)  50 H


 


Urine Ketones  20 H


 


Urine Blood  SMALL H


 


Urine Nitrite  NEGATIVE


 


Ur Leukocyte Esterase  LARGE H


 


Urine WBC (Auto)  21


 


Urine RBC (Auto)  5











Impressions: 


 





Pelvis X-Ray  02/16/17 11:18


IMPRESSION:  No acute fracture or dislocation identified.  Severe degenerative 

changes noted in both hips.  Degenerative changes noted in the pubic symphysis 

and lumbar spine


 








Lumbar Spine MRI  02/16/17 12:39


IMPRESSION:  Acute/early subacute fractures of the sacrum with  edema and 

hemorrhage in the adjacent tissues and muscles as above.  Report discussed with 

the emergency room attending physician


 














Assessment & Plan





- Diagnosis


(1) Sacral insufficiency fracture


Qualifiers: 


     Encounter type: subsequent encounter     Fracture healing: with nonunion  

      Qualified Code(s): M84.48XK - Pathological fracture, other site, 

subsequent encounter for fracture with nonunion  


Is this a current diagnosis for this admission?: Yes





(2) Chronic prescription opiate use


Is this a current diagnosis for this admission?: Yes





(3) HTN (hypertension)


Qualifiers: 


     Hypertension type: essential hypertension        Qualified Code(s): I10 - 

Essential (primary) hypertension  


Is this a current diagnosis for this admission?: Yes

## 2017-02-19 LAB — PROTHROMBIN TIME: 16.5 SEC (ref 11.4–15.4)

## 2017-02-19 RX ADMIN — GABAPENTIN SCH MG: 400 CAPSULE ORAL at 09:19

## 2017-02-19 RX ADMIN — METFORMIN HYDROCHLORIDE SCH MG: 850 TABLET, FILM COATED ORAL at 09:20

## 2017-02-19 RX ADMIN — FERROUS SULFATE TAB 325 MG (65 MG ELEMENTAL FE) SCH MG: 325 (65 FE) TAB at 09:19

## 2017-02-19 RX ADMIN — ATORVASTATIN CALCIUM SCH MG: 10 TABLET, FILM COATED ORAL at 22:32

## 2017-02-19 RX ADMIN — LEVOTHYROXINE SODIUM SCH MG: 25 TABLET ORAL at 09:20

## 2017-02-19 RX ADMIN — INSULIN GLARGINE SCH UNIT: 100 INJECTION, SOLUTION SUBCUTANEOUS at 22:30

## 2017-02-19 RX ADMIN — LANSOPRAZOLE SCH MG: 30 TABLET, ORALLY DISINTEGRATING, DELAYED RELEASE ORAL at 06:27

## 2017-02-19 RX ADMIN — GABAPENTIN SCH MG: 400 CAPSULE ORAL at 06:27

## 2017-02-19 RX ADMIN — GABAPENTIN SCH MG: 400 CAPSULE ORAL at 02:00

## 2017-02-19 RX ADMIN — DEXAMETHASONE SODIUM PHOSPHATE PRN MG: 10 INJECTION INTRAMUSCULAR; INTRAVENOUS at 06:26

## 2017-02-19 RX ADMIN — DEXAMETHASONE SODIUM PHOSPHATE PRN MG: 10 INJECTION INTRAMUSCULAR; INTRAVENOUS at 10:41

## 2017-02-19 RX ADMIN — MAGNESIUM OXIDE TAB 400 MG (241.3 MG ELEMENTAL MG) SCH MG: 400 (241.3 MG) TAB at 09:20

## 2017-02-19 RX ADMIN — GABAPENTIN SCH MG: 400 CAPSULE ORAL at 22:31

## 2017-02-19 RX ADMIN — METOPROLOL SUCCINATE SCH MG: 25 TABLET, EXTENDED RELEASE ORAL at 09:20

## 2017-02-19 RX ADMIN — MULTIVITAMIN TABLET SCH TAB: TABLET at 09:20

## 2017-02-19 RX ADMIN — METFORMIN HYDROCHLORIDE SCH MG: 850 TABLET, FILM COATED ORAL at 17:19

## 2017-02-19 RX ADMIN — OMEGA-3-ACID ETHYL ESTERS SCH GM: 900 CAPSULE ORAL at 09:20

## 2017-02-19 RX ADMIN — PIOGLITAZONE SCH MG: 15 TABLET ORAL at 17:20

## 2017-02-19 RX ADMIN — FERROUS SULFATE TAB 325 MG (65 MG ELEMENTAL FE) SCH MG: 325 (65 FE) TAB at 17:19

## 2017-02-19 RX ADMIN — MORPHINE SULFATE PRN MG: 10 INJECTION INTRAMUSCULAR; INTRAVENOUS; SUBCUTANEOUS at 17:25

## 2017-02-19 RX ADMIN — GABAPENTIN SCH MG: 400 CAPSULE ORAL at 17:31

## 2017-02-19 RX ADMIN — GABAPENTIN SCH MG: 400 CAPSULE ORAL at 17:19

## 2017-02-19 RX ADMIN — MORPHINE SULFATE PRN MG: 10 INJECTION INTRAMUSCULAR; INTRAVENOUS; SUBCUTANEOUS at 06:27

## 2017-02-19 RX ADMIN — PIOGLITAZONE SCH MG: 15 TABLET ORAL at 09:20

## 2017-02-19 NOTE — PDOC PROGRESS REPORT
Subjective


Progress Note for:: 02/19/17


Subjective:: 


Patient was admitted because of acute sacral fracture in the setting of 

excessive anticoagulation with warfarin





Physical Exam


Vital Signs: 


 











Temp Pulse Resp BP Pulse Ox


 


 99.0 F   70   16   104/38 L  96 


 


 02/19/17 15:54  02/19/17 15:54  02/19/17 15:54  02/19/17 15:54  02/19/17 15:54








 Intake & Output











 02/18/17 02/19/17 02/20/17





 06:59 06:59 06:59


 


Intake Total 240 1030 980


 


Output Total 75 1000 700


 


Balance 165 30 280











General appearance: PRESENT: no acute distress


Eye exam: PRESENT: PERRLA


Respiratory exam: PRESENT: clear to auscultation conrad


Cardiovascular exam: PRESENT: +S1, +S2


Murmur grade: 3





Results


Laboratory Results: 


 





 02/17/17 03:41 





 02/17/17 03:41 








Impressions: 


 





Pelvis X-Ray  02/16/17 11:18


IMPRESSION:  No acute fracture or dislocation identified.  Severe degenerative 

changes noted in both hips.  Degenerative changes noted in the pubic symphysis 

and lumbar spine


 








Lumbar Spine MRI  02/16/17 12:39


IMPRESSION:  Acute/early subacute fractures of the sacrum with  edema and 

hemorrhage in the adjacent tissues and muscles as above.  Report discussed with 

the emergency room attending physician


 














Assessment & Plan





- Diagnosis


(1) Sacral insufficiency fracture


Qualifiers: 


     Encounter type: subsequent encounter     Fracture healing: with nonunion  

      Qualified Code(s): M84.48XK - Pathological fracture, other site, 

subsequent encounter for fracture with nonunion  


Is this a current diagnosis for this admission?: Yes





(2) Chronic prescription opiate use


Is this a current diagnosis for this admission?: Yes





(3) HTN (hypertension)


Qualifiers: 


     Hypertension type: essential hypertension        Qualified Code(s): I10 - 

Essential (primary) hypertension  


Is this a current diagnosis for this admission?: Yes

## 2017-02-20 LAB — PROTHROMBIN TIME: 14.5 SEC (ref 11.4–15.4)

## 2017-02-20 RX ADMIN — GABAPENTIN SCH MG: 400 CAPSULE ORAL at 09:41

## 2017-02-20 RX ADMIN — GABAPENTIN SCH MG: 400 CAPSULE ORAL at 06:50

## 2017-02-20 RX ADMIN — LANSOPRAZOLE SCH MG: 30 TABLET, ORALLY DISINTEGRATING, DELAYED RELEASE ORAL at 06:50

## 2017-02-20 RX ADMIN — OMEGA-3-ACID ETHYL ESTERS SCH GM: 900 CAPSULE ORAL at 09:41

## 2017-02-20 RX ADMIN — ATORVASTATIN CALCIUM SCH MG: 10 TABLET, FILM COATED ORAL at 22:13

## 2017-02-20 RX ADMIN — LEVOTHYROXINE SODIUM SCH MG: 25 TABLET ORAL at 09:42

## 2017-02-20 RX ADMIN — METOPROLOL SUCCINATE SCH MG: 25 TABLET, EXTENDED RELEASE ORAL at 09:41

## 2017-02-20 RX ADMIN — OXYCODONE AND ACETAMINOPHEN PRN TAB: 5; 325 TABLET ORAL at 16:50

## 2017-02-20 RX ADMIN — METFORMIN HYDROCHLORIDE SCH MG: 850 TABLET, FILM COATED ORAL at 18:51

## 2017-02-20 RX ADMIN — PIOGLITAZONE SCH MG: 15 TABLET ORAL at 18:51

## 2017-02-20 RX ADMIN — METFORMIN HYDROCHLORIDE SCH MG: 850 TABLET, FILM COATED ORAL at 09:46

## 2017-02-20 RX ADMIN — OXYCODONE AND ACETAMINOPHEN PRN TAB: 5; 325 TABLET ORAL at 22:18

## 2017-02-20 RX ADMIN — GABAPENTIN SCH MG: 400 CAPSULE ORAL at 22:13

## 2017-02-20 RX ADMIN — WARFARIN SODIUM SCH MG: 5 TABLET ORAL at 22:12

## 2017-02-20 RX ADMIN — GABAPENTIN SCH MG: 400 CAPSULE ORAL at 03:20

## 2017-02-20 RX ADMIN — INSULIN GLARGINE SCH UNIT: 100 INJECTION, SOLUTION SUBCUTANEOUS at 22:14

## 2017-02-20 RX ADMIN — GABAPENTIN SCH MG: 400 CAPSULE ORAL at 18:51

## 2017-02-20 RX ADMIN — MAGNESIUM OXIDE TAB 400 MG (241.3 MG ELEMENTAL MG) SCH MG: 400 (241.3 MG) TAB at 09:41

## 2017-02-20 RX ADMIN — FERROUS SULFATE TAB 325 MG (65 MG ELEMENTAL FE) SCH MG: 325 (65 FE) TAB at 18:51

## 2017-02-20 RX ADMIN — OXYCODONE AND ACETAMINOPHEN PRN TAB: 5; 325 TABLET ORAL at 09:40

## 2017-02-20 RX ADMIN — MULTIVITAMIN TABLET SCH TAB: TABLET at 09:42

## 2017-02-20 RX ADMIN — GABAPENTIN SCH MG: 400 CAPSULE ORAL at 16:48

## 2017-02-20 RX ADMIN — PIOGLITAZONE SCH MG: 15 TABLET ORAL at 09:46

## 2017-02-20 RX ADMIN — FERROUS SULFATE TAB 325 MG (65 MG ELEMENTAL FE) SCH MG: 325 (65 FE) TAB at 09:41

## 2017-02-21 LAB — PROTHROMBIN TIME: 12.9 SEC (ref 11.4–15.4)

## 2017-02-21 RX ADMIN — METFORMIN HYDROCHLORIDE SCH MG: 850 TABLET, FILM COATED ORAL at 09:33

## 2017-02-21 RX ADMIN — FERROUS SULFATE TAB 325 MG (65 MG ELEMENTAL FE) SCH MG: 325 (65 FE) TAB at 18:21

## 2017-02-21 RX ADMIN — GABAPENTIN SCH MG: 400 CAPSULE ORAL at 22:51

## 2017-02-21 RX ADMIN — MULTIVITAMIN TABLET SCH TAB: TABLET at 09:36

## 2017-02-21 RX ADMIN — METFORMIN HYDROCHLORIDE SCH MG: 850 TABLET, FILM COATED ORAL at 18:21

## 2017-02-21 RX ADMIN — LEVOTHYROXINE SODIUM SCH MG: 25 TABLET ORAL at 09:34

## 2017-02-21 RX ADMIN — GABAPENTIN SCH MG: 400 CAPSULE ORAL at 06:08

## 2017-02-21 RX ADMIN — ENOXAPARIN SODIUM SCH MG: 80 INJECTION SUBCUTANEOUS at 22:51

## 2017-02-21 RX ADMIN — OXYCODONE AND ACETAMINOPHEN PRN TAB: 5; 325 TABLET ORAL at 09:49

## 2017-02-21 RX ADMIN — INSULIN GLARGINE SCH UNIT: 100 INJECTION, SOLUTION SUBCUTANEOUS at 22:51

## 2017-02-21 RX ADMIN — OXYCODONE AND ACETAMINOPHEN PRN TAB: 5; 325 TABLET ORAL at 18:24

## 2017-02-21 RX ADMIN — GABAPENTIN SCH MG: 400 CAPSULE ORAL at 18:21

## 2017-02-21 RX ADMIN — FERROUS SULFATE TAB 325 MG (65 MG ELEMENTAL FE) SCH MG: 325 (65 FE) TAB at 09:33

## 2017-02-21 RX ADMIN — MAGNESIUM OXIDE TAB 400 MG (241.3 MG ELEMENTAL MG) SCH MG: 400 (241.3 MG) TAB at 09:35

## 2017-02-21 RX ADMIN — PIOGLITAZONE SCH MG: 15 TABLET ORAL at 18:21

## 2017-02-21 RX ADMIN — METOPROLOL SUCCINATE SCH MG: 25 TABLET, EXTENDED RELEASE ORAL at 09:35

## 2017-02-21 RX ADMIN — WARFARIN SODIUM SCH MG: 5 TABLET ORAL at 22:51

## 2017-02-21 RX ADMIN — OMEGA-3-ACID ETHYL ESTERS SCH GM: 900 CAPSULE ORAL at 09:34

## 2017-02-21 RX ADMIN — GABAPENTIN SCH MG: 400 CAPSULE ORAL at 02:16

## 2017-02-21 RX ADMIN — GABAPENTIN SCH MG: 400 CAPSULE ORAL at 14:19

## 2017-02-21 RX ADMIN — ENOXAPARIN SODIUM SCH MG: 80 INJECTION SUBCUTANEOUS at 09:40

## 2017-02-21 RX ADMIN — PIOGLITAZONE SCH MG: 15 TABLET ORAL at 09:36

## 2017-02-21 RX ADMIN — ATORVASTATIN CALCIUM SCH MG: 10 TABLET, FILM COATED ORAL at 22:52

## 2017-02-21 RX ADMIN — GABAPENTIN SCH MG: 400 CAPSULE ORAL at 09:34

## 2017-02-21 RX ADMIN — LANSOPRAZOLE SCH MG: 30 TABLET, ORALLY DISINTEGRATING, DELAYED RELEASE ORAL at 06:08

## 2017-02-21 NOTE — PDOC PROGRESS REPORT
Subjective


Progress Note for:: 02/21/17


Subjective:: 


She denied chest pain, difficulty with breathing, fever or chills. No nausea or 

vomiting. Tolerating oral feeding. Patient reported fairly controlled pain on 

current medication management. Participated minimally in physical therapy 

session yesterday due to reported worsened pain. Her INR is currently in normal 

range.





Physical Exam


Vital Signs: 


 











Temp Pulse Resp BP Pulse Ox


 


 98.4 F   66   18   132/44 H  96 


 


 02/21/17 04:18  02/21/17 07:00  02/21/17 04:18  02/21/17 04:18  02/21/17 04:18








 Intake & Output











 02/20/17 02/21/17 02/22/17





 06:59 06:59 06:59


 


Intake Total 1280 1700 


 


Output Total 1100 2000 


 


Balance 180 -300 











Physical Exam: 


General appearance: PRESENT: no acute distress, cooperative, obese


Eye exam: PRESENT: conjunctiva pink, EOMI, PERRLA


Mouth exam: PRESENT: moist


Neck exam: PRESENT: full ROM.  ABSENT: carotid bruit, JVD, lymphadenopathy, 

thyromegaly


Respiratory exam: ABSENT: accessory muscle use, chest wall tenderness, clear to 

auscultation conrad, crackles, decreased breath sounds, prolonged expiratory phase

, rales, retraction, rhonchi, stridor, symmetrical, tachypnea, unlabored, 

wheezes, other


Cardiovascular exam: PRESENT: RRR, systolic murmur.  ABSENT: bradycardia, clicks

, diastolic murmur, gallop, irregular rhythm, rubs, +S1, +S2, tachycardia, 

other Murmur grade: 3


GI/Abdominal exam: PRESENT: normal bowel sounds, soft.  ABSENT: distended, 

guarding, mass, organomegaly, rebound, tenderness


Extremities exam: PRESENT: full ROM


Musculoskeletal exam: PRESENT: deformity - from joint involved arthritis


Neurological exam: PRESENT: alert, awake, oriented to person, oriented to place

, oriented to time, oriented to situation, CN II-XII grossly intact.  ABSENT: 

motor sensory deficit


Psychiatric exam: PRESENT: appropriate affect, normal mood.  ABSENT: homicidal 

ideation, suicidal ideation


Skin exam: PRESENT: dry, intact, warm.  ABSENT: cyanosis, rash


Murmur grade: 3





Results


Laboratory Results: 


 





 02/17/17 03:41 





 02/17/17 03:41 





                                                                               

                                     PT/INR = 12.9/0.94


Impressions: 


 





Pelvis X-Ray  02/16/17 11:18


IMPRESSION:  No acute fracture or dislocation identified.  Severe degenerative 

changes noted in both hips.  Degenerative changes noted in the pubic symphysis 

and lumbar spine


 








Lumbar Spine MRI  02/16/17 12:39


IMPRESSION:  Acute/early subacute fractures of the sacrum with  edema and 

hemorrhage in the adjacent tissues and muscles as above.  Report discussed with 

the emergency room attending physician


 














Assessment & Plan





- Diagnosis


(1) Chronic prescription opiate use


Is this a current diagnosis for this admission?: YesPlan: 





see attending physician orders.








(2) Excessive anticoagulation


Is this a current diagnosis for this admission?: YesPlan: 











Resolved. Her PT / INR  are in normal range presently. I will start on Lovenox 

bridge until INR is over 2.0. See attending physician orders.








(3) Sacral insufficiency fracture


Qualifiers: 


     Encounter type: initial encounter     Qualified Code(s): M84.48XA - 

Pathological fracture, other site, initial encounter for fracture  


Is this a current diagnosis for this admission?: YesPlan: 











See attending physician orders. I will continue minimal ambulatory activity as 

tolerated with physical therapist supervision. Maintain on oral opiate therapy. 

Patient is agreeable to short term rehabilitation at SNF upon discharge.








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


Qualifiers: 


     Laterality: unspecified laterality        Qualified Code(s): I82.5Z9 - 

Chronic embolism and thrombosis of unspecified deep veins of unspecified distal 

lower extremity  


Is this a current diagnosis for this admission?: YesPlan: 





see attending physician orders.








(5) Chronic pain syndrome


Is this a current diagnosis for this admission?: YesPlan: 





see attending physician orders.








(6) GERD (gastroesophageal reflux disease)


Qualifiers: 


     Esophagitis presence: without esophagitis        Qualified Code(s): K21.9 

- Gastro-esophageal reflux disease without esophagitis  


Is this a current diagnosis for this admission?: YesPlan: 





see attending physician orders.








(7) HLD (hyperlipidemia)


Qualifiers: 


     Hyperlipidemia type: pure hypercholesterolemia        Qualified Code(s): 

E78.00 - Pure hypercholesterolemia, unspecified; E78.0 - Pure 

hypercholesterolemia  


Is this a current diagnosis for this admission?: YesPlan: 





see attending physician orders.








(8) HTN (hypertension)


Qualifiers: 


     Hypertension type: essential hypertension        Qualified Code(s): I10 - 

Essential (primary) hypertension  


Is this a current diagnosis for this admission?: YesPlan: 





see attending physician orders.








(9) Leg DVT (deep venous thromboembolism), chronic


Qualifiers: 


     Laterality: unspecified laterality        Qualified Code(s): I82.509 - 

Chronic embolism and thrombosis of unspecified deep veins of unspecified lower 

extremity  


Is this a current diagnosis for this admission?: YesPlan: 





see attending physician orders.








(10) Fall at home


Qualifiers: 


     Encounter type: initial encounter        Qualified Code(s): W19.XXXA - 

Unspecified fall, initial encounter; Y92.099 - Unspecified place in other non-

institutional residence as the place of occurrence of the external cause  


Is this a current diagnosis for this admission?: YesPlan: 








see attending physician orders. She will continue rehabilitation efforts with 

intent to transfer to SNF short term program upon discharge.








(11) Diabetes mellitus type 2 in obese


Is this a current diagnosis for this admission?: YesPlan: 


see attending physician orders.











- Time


Time Spent with patient: 25-34 minutes


Medications reviewed and adjusted accordingly: Yes


Anticipated discharge: SNF


Within: within 72 hours





- Inpatient Certification


Medical Necessity: Need Close Monitoring Due to Risk of Patient Decompensation, 

Need For Continuous Telemetry Monitoring, Need for Pain Control, Risk of 

Complication if Not Cared For in Hospital


Post Hospital Care: D/C or Transfer Summary





- Plan Summary


Plan Summary: 


see attending physician orders.

## 2017-02-22 LAB
APPEARANCE UR: CLEAR
BILIRUB UR QL STRIP: NEGATIVE
ERYTHROCYTE [DISTWIDTH] IN BLOOD BY AUTOMATED COUNT: 15.8 % (ref 11.5–14)
GLUCOSE UR STRIP-MCNC: NEGATIVE MG/DL
HCT VFR BLD CALC: 32.7 % (ref 36–47)
HGB BLD-MCNC: 10.9 G/DL (ref 12–15.5)
HGB HCT DIFFERENCE: 0
KETONES UR STRIP-MCNC: NEGATIVE MG/DL
MCH RBC QN AUTO: 31.4 PG (ref 27–33.4)
MCHC RBC AUTO-ENTMCNC: 33.4 G/DL (ref 32–36)
MCV RBC AUTO: 94 FL (ref 80–97)
NITRITE UR QL STRIP: NEGATIVE
PH UR STRIP: 6 [PH] (ref 5–9)
PROT UR STRIP-MCNC: NEGATIVE MG/DL
PROTHROMBIN TIME: 15.3 SEC (ref 11.4–15.4)
RBC # BLD AUTO: 3.48 10^6/UL (ref 3.72–5.28)
SP GR UR STRIP: 1.01
UROBILINOGEN UR-MCNC: 4 MG/DL (ref ?–2)
WBC # BLD AUTO: 7.7 10^3/UL (ref 4–10.5)

## 2017-02-22 RX ADMIN — GABAPENTIN SCH MG: 400 CAPSULE ORAL at 06:48

## 2017-02-22 RX ADMIN — GABAPENTIN SCH MG: 400 CAPSULE ORAL at 02:24

## 2017-02-22 RX ADMIN — ENOXAPARIN SODIUM SCH MG: 80 INJECTION SUBCUTANEOUS at 09:53

## 2017-02-22 RX ADMIN — ENOXAPARIN SODIUM SCH MG: 80 INJECTION SUBCUTANEOUS at 22:04

## 2017-02-22 RX ADMIN — GABAPENTIN SCH MG: 400 CAPSULE ORAL at 09:49

## 2017-02-22 RX ADMIN — METOPROLOL SUCCINATE SCH MG: 25 TABLET, EXTENDED RELEASE ORAL at 09:50

## 2017-02-22 RX ADMIN — PIOGLITAZONE SCH MG: 15 TABLET ORAL at 09:51

## 2017-02-22 RX ADMIN — PIOGLITAZONE SCH MG: 15 TABLET ORAL at 19:03

## 2017-02-22 RX ADMIN — GABAPENTIN SCH MG: 400 CAPSULE ORAL at 22:04

## 2017-02-22 RX ADMIN — LEVOTHYROXINE SODIUM SCH MG: 25 TABLET ORAL at 09:49

## 2017-02-22 RX ADMIN — FERROUS SULFATE TAB 325 MG (65 MG ELEMENTAL FE) SCH MG: 325 (65 FE) TAB at 09:49

## 2017-02-22 RX ADMIN — GABAPENTIN SCH MG: 400 CAPSULE ORAL at 19:03

## 2017-02-22 RX ADMIN — MULTIVITAMIN TABLET SCH TAB: TABLET at 09:50

## 2017-02-22 RX ADMIN — METFORMIN HYDROCHLORIDE SCH MG: 850 TABLET, FILM COATED ORAL at 19:03

## 2017-02-22 RX ADMIN — INSULIN GLARGINE SCH UNIT: 100 INJECTION, SOLUTION SUBCUTANEOUS at 22:03

## 2017-02-22 RX ADMIN — WARFARIN SODIUM SCH MG: 5 TABLET ORAL at 22:04

## 2017-02-22 RX ADMIN — OXYCODONE AND ACETAMINOPHEN PRN TAB: 5; 325 TABLET ORAL at 02:26

## 2017-02-22 RX ADMIN — FERROUS SULFATE TAB 325 MG (65 MG ELEMENTAL FE) SCH MG: 325 (65 FE) TAB at 19:04

## 2017-02-22 RX ADMIN — METFORMIN HYDROCHLORIDE SCH MG: 850 TABLET, FILM COATED ORAL at 09:51

## 2017-02-22 RX ADMIN — ATORVASTATIN CALCIUM SCH MG: 10 TABLET, FILM COATED ORAL at 22:04

## 2017-02-22 RX ADMIN — MAGNESIUM OXIDE TAB 400 MG (241.3 MG ELEMENTAL MG) SCH MG: 400 (241.3 MG) TAB at 09:50

## 2017-02-22 RX ADMIN — OMEGA-3-ACID ETHYL ESTERS SCH GM: 900 CAPSULE ORAL at 09:50

## 2017-02-22 RX ADMIN — INSULIN LISPRO PRN UNIT: 100 INJECTION, SOLUTION INTRAVENOUS; SUBCUTANEOUS at 08:27

## 2017-02-22 RX ADMIN — LANSOPRAZOLE SCH MG: 30 TABLET, ORALLY DISINTEGRATING, DELAYED RELEASE ORAL at 06:48

## 2017-02-22 RX ADMIN — GABAPENTIN SCH MG: 400 CAPSULE ORAL at 14:23

## 2017-02-22 NOTE — PDOC PROGRESS REPORT
Subjective


Progress Note for:: 02/22/17


Subjective:: 


She denied chest pain, difficulty with breathing, fever or chills. No nausea or 

vomiting. Tolerating oral feeding. Patient reported fairly controlled pain on 

current medication management. Patient remain minimally mobile due to pain in 

sacral region. Her INR remain n subtherapeutic level.





Physical Exam


Vital Signs: 


 











Temp Pulse Resp BP Pulse Ox


 


 98.6 F   68   21 H  115/41 L  95 


 


 02/22/17 03:32  02/22/17 07:00  02/22/17 03:32  02/22/17 03:32  02/22/17 03:32








 Intake & Output











 02/21/17 02/22/17 02/23/17





 06:59 06:59 06:59


 


Intake Total 1700 1250 


 


Output Total 2000 1650 


 


Balance -300 -400 











Physical Exam: 


General appearance: PRESENT: no acute distress, cooperative, obese


Eye exam: PRESENT: conjunctiva pink, EOMI, PERRLA


Mouth exam: PRESENT: moist


Neck exam: PRESENT: full ROM.  ABSENT: carotid bruit, JVD, lymphadenopathy, 

thyromegaly


Respiratory exam: ABSENT: accessory muscle use, chest wall tenderness, clear to 

auscultation conrad, crackles, decreased breath sounds, prolonged expiratory phase

, rales, retraction, rhonchi, stridor, symmetrical, tachypnea, unlabored, 

wheezes, other


Cardiovascular exam: PRESENT: RRR, systolic murmur.  ABSENT: bradycardia, clicks

, diastolic murmur, gallop, irregular rhythm, rubs, +S1, +S2, tachycardia, 

other Murmur grade: 3


GI/Abdominal exam: PRESENT: normal bowel sounds, soft.  ABSENT: distended, 

guarding, mass, organomegaly, rebound, tenderness


Extremities exam: PRESENT: full ROM


Musculoskeletal exam: PRESENT: deformity - from joint involved arthritis. Other

: Sacral spine region tenderness 


Neurological exam: PRESENT: alert, awake, oriented to person, oriented to place

, oriented to time, oriented to situation, CN II-XII grossly intact.  ABSENT: 

motor sensory deficit


Psychiatric exam: PRESENT: appropriate affect, normal mood.  ABSENT: homicidal 

ideation, suicidal ideation


Skin exam: PRESENT: dry, intact, warm.  ABSENT: cyanosis, rash


Murmur grade: 3





Results


Laboratory Results: 


 





 02/22/17 04:24 





 02/17/17 03:41 





 











  02/22/17 02/22/17





  04:24 04:24


 


WBC  7.7 


 


RBC  3.48 L 


 


Hgb  10.9 L 


 


Hct  32.7 L 


 


MCV  94 


 


MCH  31.4 


 


MCHC  33.4 


 


RDW  15.8 H 


 


Plt Count  344 


 


Magnesium   1.7








                                                                               

                                                       PT / INR = 15.3 / 1.7


Impressions: 


 





Pelvis X-Ray  02/16/17 11:18


IMPRESSION:  No acute fracture or dislocation identified.  Severe degenerative 

changes noted in both hips.  Degenerative changes noted in the pubic symphysis 

and lumbar spine


 








Lumbar Spine MRI  02/16/17 12:39


IMPRESSION:  Acute/early subacute fractures of the sacrum with  edema and 

hemorrhage in the adjacent tissues and muscles as above.  Report discussed with 

the emergency room attending physician


 














Assessment & Plan





- Diagnosis


(1) Chronic prescription opiate use


Is this a current diagnosis for this admission?: YesPlan: 





see attending physician orders.








(2) Excessive anticoagulation


Is this a current diagnosis for this admission?: YesPlan: 














Resolved. Her PT / INR  are in normal range presently. Continue Lovenox bridge 

until INR is over 2.0. See attending physician orders.








(3) Sacral insufficiency fracture


Qualifiers: 


     Encounter type: initial encounter     Qualified Code(s): M84.48XA - 

Pathological fracture, other site, initial encounter for fracture  


Is this a current diagnosis for this admission?: YesPlan: 














See attending physician orders. I will continue minimal ambulatory activity as 

tolerated with physical therapist supervision. Maintain on oral opiate therapy. 

Patient is response from SNF for bed allotment regarding short term 

rehabilitation.








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


Qualifiers: 


     Laterality: unspecified laterality        Qualified Code(s): I82.5Z9 - 

Chronic embolism and thrombosis of unspecified deep veins of unspecified distal 

lower extremity  


Is this a current diagnosis for this admission?: YesPlan: 





see attending physician orders.








(5) Chronic pain syndrome


Is this a current diagnosis for this admission?: YesPlan: 





see attending physician orders.








(6) GERD (gastroesophageal reflux disease)


Qualifiers: 


     Esophagitis presence: without esophagitis        Qualified Code(s): K21.9 

- Gastro-esophageal reflux disease without esophagitis  


Is this a current diagnosis for this admission?: YesPlan: 





see attending physician orders.








(7) HLD (hyperlipidemia)


Qualifiers: 


     Hyperlipidemia type: pure hypercholesterolemia        Qualified Code(s): 

E78.00 - Pure hypercholesterolemia, unspecified; E78.0 - Pure 

hypercholesterolemia  


Is this a current diagnosis for this admission?: YesPlan: 





see attending physician orders.








(8) HTN (hypertension)


Qualifiers: 


     Hypertension type: essential hypertension        Qualified Code(s): I10 - 

Essential (primary) hypertension  


Is this a current diagnosis for this admission?: YesPlan: 





see attending physician orders.








(9) Leg DVT (deep venous thromboembolism), chronic


Qualifiers: 


     Laterality: unspecified laterality        Qualified Code(s): I82.509 - 

Chronic embolism and thrombosis of unspecified deep veins of unspecified lower 

extremity  


Is this a current diagnosis for this admission?: YesPlan: 





see attending physician orders.








(10) Fall at home


Qualifiers: 


     Encounter type: initial encounter        Qualified Code(s): W19.XXXA - 

Unspecified fall, initial encounter; Y92.099 - Unspecified place in other non-

institutional residence as the place of occurrence of the external cause  


Is this a current diagnosis for this admission?: YesPlan: 








see attending physician orders. She will continue rehabilitation efforts with 

intent to transfer to SNF short term program upon discharge.








(11) Diabetes mellitus type 2 in obese


Is this a current diagnosis for this admission?: YesPlan: 


see attending physician orders.











- Time


Time Spent with patient: 25-34 minutes





- Inpatient Certification


Based on my medical assessment, after consideration of the patient's 

comorbidities, presenting symptoms, or acuity I expect that the services needed 

warrant INPATIENT care.: Yes


I certify that my determination is in accordance with my understanding of 

Medicare's requirements for reasonable and necessary INPATIENT services [42 CFR 

412.3e].: Yes


Medical Necessity: Need For Continuous Telemetry Monitoring, Need for Pain 

Control, Risk of Complication if Not Cared For in Hospital





- Plan Summary


Plan Summary: 


See attending physician orders.

## 2017-02-23 LAB — PROTHROMBIN TIME: 15.7 SEC (ref 11.4–15.4)

## 2017-02-23 RX ADMIN — GABAPENTIN SCH MG: 400 CAPSULE ORAL at 17:14

## 2017-02-23 RX ADMIN — FERROUS SULFATE TAB 325 MG (65 MG ELEMENTAL FE) SCH MG: 325 (65 FE) TAB at 10:00

## 2017-02-23 RX ADMIN — OMEGA-3-ACID ETHYL ESTERS SCH GM: 900 CAPSULE ORAL at 10:00

## 2017-02-23 RX ADMIN — ATORVASTATIN CALCIUM SCH MG: 10 TABLET, FILM COATED ORAL at 23:16

## 2017-02-23 RX ADMIN — GABAPENTIN SCH MG: 400 CAPSULE ORAL at 10:00

## 2017-02-23 RX ADMIN — GABAPENTIN SCH MG: 400 CAPSULE ORAL at 17:20

## 2017-02-23 RX ADMIN — GABAPENTIN SCH MG: 400 CAPSULE ORAL at 05:21

## 2017-02-23 RX ADMIN — GABAPENTIN SCH MG: 400 CAPSULE ORAL at 01:11

## 2017-02-23 RX ADMIN — LANSOPRAZOLE SCH MG: 30 TABLET, ORALLY DISINTEGRATING, DELAYED RELEASE ORAL at 05:21

## 2017-02-23 RX ADMIN — PIOGLITAZONE SCH MG: 15 TABLET ORAL at 10:00

## 2017-02-23 RX ADMIN — METFORMIN HYDROCHLORIDE SCH MG: 850 TABLET, FILM COATED ORAL at 10:00

## 2017-02-23 RX ADMIN — PIOGLITAZONE SCH MG: 15 TABLET ORAL at 17:14

## 2017-02-23 RX ADMIN — OXYCODONE AND ACETAMINOPHEN PRN TAB: 5; 325 TABLET ORAL at 23:15

## 2017-02-23 RX ADMIN — FERROUS SULFATE TAB 325 MG (65 MG ELEMENTAL FE) SCH MG: 325 (65 FE) TAB at 17:14

## 2017-02-23 RX ADMIN — ENOXAPARIN SODIUM SCH MG: 80 INJECTION SUBCUTANEOUS at 23:16

## 2017-02-23 RX ADMIN — ENOXAPARIN SODIUM SCH MG: 80 INJECTION SUBCUTANEOUS at 10:08

## 2017-02-23 RX ADMIN — LEVOTHYROXINE SODIUM SCH MG: 25 TABLET ORAL at 10:00

## 2017-02-23 RX ADMIN — OXYCODONE AND ACETAMINOPHEN PRN TAB: 5; 325 TABLET ORAL at 10:08

## 2017-02-23 RX ADMIN — MULTIVITAMIN TABLET SCH TAB: TABLET at 10:00

## 2017-02-23 RX ADMIN — METFORMIN HYDROCHLORIDE SCH MG: 850 TABLET, FILM COATED ORAL at 17:14

## 2017-02-23 RX ADMIN — MAGNESIUM OXIDE TAB 400 MG (241.3 MG ELEMENTAL MG) SCH MG: 400 (241.3 MG) TAB at 10:00

## 2017-02-23 RX ADMIN — OXYCODONE AND ACETAMINOPHEN PRN TAB: 5; 325 TABLET ORAL at 01:12

## 2017-02-23 RX ADMIN — GABAPENTIN SCH MG: 400 CAPSULE ORAL at 23:17

## 2017-02-23 RX ADMIN — METOPROLOL SUCCINATE SCH MG: 25 TABLET, EXTENDED RELEASE ORAL at 10:00

## 2017-02-23 RX ADMIN — INSULIN GLARGINE SCH UNIT: 100 INJECTION, SOLUTION SUBCUTANEOUS at 23:17

## 2017-02-23 NOTE — PDOC PROGRESS REPORT
Subjective


Progress Note for:: 02/23/17


Subjective:: 


Denied chest pain, difficulty with breathing. No fever or chills. No nausea or 

vomiting. Tolerating oral feeding. Patient reported fairly controlled pain on 

current medication management. Patient remain minimally mobile due to pain in 

sacral region.  Awaiting SNF acceptance and bed availability.





Physical Exam


Vital Signs: 


 











Temp Pulse Resp BP Pulse Ox


 


 97.8 F   68   15   113/43 L  95 


 


 02/23/17 04:00  02/23/17 07:00  02/23/17 04:00  02/23/17 04:00  02/23/17 04:00








 Intake & Output











 02/22/17 02/23/17 02/24/17





 06:59 06:59 06:59


 


Intake Total 1250 1270 


 


Output Total 1650 800 


 


Balance -400 470 


 


Weight  81.3 kg 











Physical Exam: 


General appearance: PRESENT: no acute distress, cooperative, obese


Eye exam: PRESENT: conjunctiva pink, EOMI, PERRLA


Mouth exam: PRESENT: moist


Neck exam: PRESENT: full ROM.  ABSENT: carotid bruit, JVD, lymphadenopathy, 

thyromegaly


Respiratory exam: ABSENT: accessory muscle use, chest wall tenderness, clear to 

auscultation conrad, crackles, decreased breath sounds, prolonged expiratory phase

, rales, retraction, rhonchi, stridor, symmetrical, tachypnea, unlabored, 

wheezes, other


Cardiovascular exam: PRESENT: RRR, systolic murmur.  ABSENT: bradycardia, clicks

, diastolic murmur, gallop, irregular rhythm, rubs, +S1, +S2, tachycardia, 

other Murmur grade: 3


GI/Abdominal exam: PRESENT: normal bowel sounds, soft.  ABSENT: distended, 

guarding, mass, organomegaly, rebound, tenderness


Extremities exam: PRESENT: full ROM


Musculoskeletal exam: PRESENT: deformity - from joint involved arthritis. Other

: Sacral spine region tenderness 


Neurological exam: PRESENT: alert, awake, oriented to person, oriented to place

, oriented to time, oriented to situation, CN II-XII grossly intact.  ABSENT: 

motor sensory deficit


Psychiatric exam: PRESENT: appropriate affect, normal mood.  ABSENT: homicidal 

ideation, suicidal ideation


Skin exam: PRESENT: dry, intact, warm.  ABSENT: cyanosis, rash


Murmur grade: 3





Results


Laboratory Results: 


 





 02/22/17 04:24 





 02/17/17 03:41 





 











  02/22/17 02/22/17





  09:05 13:00


 


Urine Color  YELLOW 


 


Urine Appearance  CLEAR 


 


Urine pH  6.0 


 


Ur Specific Gravity  1.013 


 


Urine Protein  NEGATIVE 


 


Urine Glucose (UA)  NEGATIVE 


 


Urine Ketones  NEGATIVE 


 


Urine Blood  NEGATIVE 


 


Urine Nitrite  NEGATIVE 


 


Ur Leukocyte Esterase  TRACE H 


 


Urine WBC (Auto)  3 


 


Urine RBC (Auto)  2 


 


Stool Occult Blood   NEGATIVE











Impressions: 


 





Pelvis X-Ray  02/16/17 11:18


IMPRESSION:  No acute fracture or dislocation identified.  Severe degenerative 

changes noted in both hips.  Degenerative changes noted in the pubic symphysis 

and lumbar spine


 








Lumbar Spine MRI  02/16/17 12:39


IMPRESSION:  Acute/early subacute fractures of the sacrum with  edema and 

hemorrhage in the adjacent tissues and muscles as above.  Report discussed with 

the emergency room attending physician


 














Assessment & Plan





- Diagnosis


(1) Chronic prescription opiate use


Is this a current diagnosis for this admission?: YesPlan: 





see attending physician orders.








(2) Excessive anticoagulation


Is this a current diagnosis for this admission?: YesPlan: 














Resolved. Her PT / INR  are in normal range presently. Continue Lovenox bridge 

until INR is over 2.0. See attending physician orders.








(3) Sacral insufficiency fracture


Qualifiers: 


     Encounter type: initial encounter     Qualified Code(s): M84.48XA - 

Pathological fracture, other site, initial encounter for fracture  


Is this a current diagnosis for this admission?: YesPlan: 

















See attending physician orders. I will continue minimal ambulatory activity as 

tolerated with physical therapist supervision. Maintain on oral opiate therapy. 

Awaiting SNF acceptance and bed availability.








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


Qualifiers: 


     Laterality: unspecified laterality        Qualified Code(s): I82.5Z9 - 

Chronic embolism and thrombosis of unspecified deep veins of unspecified distal 

lower extremity  


Is this a current diagnosis for this admission?: YesPlan: 





see attending physician orders.








(5) Chronic pain syndrome


Is this a current diagnosis for this admission?: YesPlan: 





see attending physician orders.








(6) GERD (gastroesophageal reflux disease)


Qualifiers: 


     Esophagitis presence: without esophagitis        Qualified Code(s): K21.9 

- Gastro-esophageal reflux disease without esophagitis  


Is this a current diagnosis for this admission?: Yes





(7) HLD (hyperlipidemia)


Qualifiers: 


     Hyperlipidemia type: pure hypercholesterolemia        Qualified Code(s): 

E78.00 - Pure hypercholesterolemia, unspecified; E78.0 - Pure 

hypercholesterolemia  


Is this a current diagnosis for this admission?: Yes





(8) HTN (hypertension)


Qualifiers: 


     Hypertension type: essential hypertension        Qualified Code(s): I10 - 

Essential (primary) hypertension  


Is this a current diagnosis for this admission?: YesPlan: 





see attending physician orders.








(9) Leg DVT (deep venous thromboembolism), chronic


Qualifiers: 


     Laterality: unspecified laterality        Qualified Code(s): I82.509 - 

Chronic embolism and thrombosis of unspecified deep veins of unspecified lower 

extremity  


Is this a current diagnosis for this admission?: YesPlan: 





see attending physician orders.








(10) Fall at home


Qualifiers: 


     Encounter type: initial encounter        Qualified Code(s): W19.XXXA - 

Unspecified fall, initial encounter; Y92.099 - Unspecified place in other non-

institutional residence as the place of occurrence of the external cause  


Is this a current diagnosis for this admission?: YesPlan: 








see attending physician orders. She will continue rehabilitation efforts with 

intent to transfer to SNF short term program upon discharge.








(11) Diabetes mellitus type 2 in obese


Is this a current diagnosis for this admission?: YesPlan: 


see attending physician orders.











- Time


Time Spent with patient: 25-34 minutes


Medications reviewed and adjusted accordingly: Yes


Anticipated discharge: SNF - for short term rehabilitation


Within: within 48 hours





- Inpatient Certification


Based on my medical assessment, after consideration of the patient's 

comorbidities, presenting symptoms, or acuity I expect that the services needed 

warrant INPATIENT care.: Yes


I certify that my determination is in accordance with my understanding of 

Medicare's requirements for reasonable and necessary INPATIENT services [42 CFR 

412.3e].: Yes


Medical Necessity: Need For Continuous Telemetry Monitoring, Need for Pain 

Control, Risk of Complication if Not Cared For in Hospital


Post Hospital Care: D/C or Transfer Summary





- Plan Summary


Plan Summary: 


See attending physician orders.

## 2017-02-24 VITALS — SYSTOLIC BLOOD PRESSURE: 123 MMHG | DIASTOLIC BLOOD PRESSURE: 42 MMHG

## 2017-02-24 LAB
ERYTHROCYTE [DISTWIDTH] IN BLOOD BY AUTOMATED COUNT: 16.1 % (ref 11.5–14)
HCT VFR BLD CALC: 32.7 % (ref 36–47)
HGB BLD-MCNC: 10.7 G/DL (ref 12–15.5)
HGB HCT DIFFERENCE: -0.6
MCH RBC QN AUTO: 31 PG (ref 27–33.4)
MCHC RBC AUTO-ENTMCNC: 32.8 G/DL (ref 32–36)
MCV RBC AUTO: 95 FL (ref 80–97)
RBC # BLD AUTO: 3.46 10^6/UL (ref 3.72–5.28)
WBC # BLD AUTO: 7.4 10^3/UL (ref 4–10.5)

## 2017-02-24 RX ADMIN — MULTIVITAMIN TABLET SCH TAB: TABLET at 09:53

## 2017-02-24 RX ADMIN — ENOXAPARIN SODIUM SCH MG: 80 INJECTION SUBCUTANEOUS at 09:54

## 2017-02-24 RX ADMIN — MAGNESIUM OXIDE TAB 400 MG (241.3 MG ELEMENTAL MG) SCH MG: 400 (241.3 MG) TAB at 09:53

## 2017-02-24 RX ADMIN — METOPROLOL SUCCINATE SCH MG: 25 TABLET, EXTENDED RELEASE ORAL at 09:53

## 2017-02-24 RX ADMIN — PIOGLITAZONE SCH MG: 15 TABLET ORAL at 09:54

## 2017-02-24 RX ADMIN — LEVOTHYROXINE SODIUM SCH MG: 25 TABLET ORAL at 09:53

## 2017-02-24 RX ADMIN — GABAPENTIN SCH MG: 400 CAPSULE ORAL at 09:53

## 2017-02-24 RX ADMIN — METFORMIN HYDROCHLORIDE SCH MG: 850 TABLET, FILM COATED ORAL at 09:54

## 2017-02-24 RX ADMIN — OXYCODONE AND ACETAMINOPHEN PRN TAB: 5; 325 TABLET ORAL at 09:54

## 2017-02-24 RX ADMIN — LANSOPRAZOLE SCH MG: 30 TABLET, ORALLY DISINTEGRATING, DELAYED RELEASE ORAL at 06:15

## 2017-02-24 RX ADMIN — GABAPENTIN SCH MG: 400 CAPSULE ORAL at 06:15

## 2017-02-24 RX ADMIN — GABAPENTIN SCH MG: 400 CAPSULE ORAL at 03:28

## 2017-02-24 RX ADMIN — FERROUS SULFATE TAB 325 MG (65 MG ELEMENTAL FE) SCH MG: 325 (65 FE) TAB at 09:53

## 2017-02-24 RX ADMIN — GABAPENTIN SCH MG: 400 CAPSULE ORAL at 13:43

## 2017-02-24 RX ADMIN — OMEGA-3-ACID ETHYL ESTERS SCH GM: 900 CAPSULE ORAL at 09:53

## 2017-02-24 NOTE — PDOC TRANSFER SUMMARY
General





- Admit/Disc Date/PCP


Admission Date/Primary Care Provider: 


  02/16/17 19:17





  RADHA GANNON





Discharge Date: 02/24/17





- Discharge Diagnosis


(1) Chronic prescription opiate use


Is this a current diagnosis for this admission?: Yes





(2) Excessive anticoagulation


Is this a current diagnosis for this admission?: Yes





(3) Sacral insufficiency fracture


Is this a current diagnosis for this admission?: Yes





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


Is this a current diagnosis for this admission?: Yes





(5) Chronic pain syndrome


Is this a current diagnosis for this admission?: Yes





(6) GERD (gastroesophageal reflux disease)


Is this a current diagnosis for this admission?: Yes





(7) HLD (hyperlipidemia)


Is this a current diagnosis for this admission?: Yes





(8) HTN (hypertension)


Is this a current diagnosis for this admission?: Yes





(9) Leg DVT (deep venous thromboembolism), chronic


Is this a current diagnosis for this admission?: Yes





(10) Fall at home


Is this a current diagnosis for this admission?: Yes





(11) Diabetes mellitus type 2 in obese


Is this a current diagnosis for this admission?: Yes








- Additional Information


Resuscitation Status: Full Code


Home Medications: 








Ergocalciferol (Vitamin D2) [Vitamin D2] 50,000 units PO Q7D 02/16/17 


Ferrous Sulfate [Feosol] 650 mg PO BID 02/16/17 


Fish Oil/Dha/Epa [Fish Oil 1,200 mg Fish Oil] 2 cap PO DAILY 02/16/17 


Gabapentin [Neurontin 400 mg Capsule] 400 mg PO Q4 02/16/17 


Insulin Glargine,Hum.rec.anlog [Basaglar Kwikpen U-100] 14 units SQ QHS 02/16/ 17 


Levothyroxine Sodium [Synthroid 0.025 mg Tablet] 25 mcg PO DAILY 02/16/17 


Magnesium 500 mg PO DAILY 02/16/17 


Metoprolol Succinate 25 mg PO DAILY 02/16/17 


Multivits-Min/Iron/FA/Lutein [Centrum Silver Women Tablet] 2 tab PO BID 02/16/ 17 


Pioglitazone HCl/Metformin HCl [Pioglitazone-Metformin 15850] 1 tab PO BID 02/ 16/17 


Pravastatin Sodium 40 mg PO QHS 02/16/17 


Hydrocodone/Acetaminophen [Hydrocodon-Acetaminoph 7.5-325] 1 tab PO Q8 #60 

tablet 02/24/17 


Warfarin Sodium [Coumadin 5 mg Tablet] 5 mg PO QHS #30  02/24/17 











History of Present Illness


Admission Date/PCP: 


  02/16/17 19:17





  RADHA GANNON





History of Present Illness: 


The patient is a 71-year-old white female with a very complicated past medical 

history who is in the usual state of health until approximately 2 weeks ago 

when she fell backwards while pressing her teeth into a bathroom wall and 

subsequently breaking the wall.  Her son helped her up from this episode.  At 

that point she had some pain, but over the course last several days the pain 

has become dramatically worse.  She was evaluated in the emergency room where 

an MRI scan demonstrated the presence of a sacral insufficiency fracture.  The 

patient is admitted to Dr. COOPER Robert.  Kylee maddox is consult at for fracture 

management.





Hospital Course


Hospital Course: 


Patient was admitted for acute/subacute sacral insufficiency fracture following 

a fall at home couple of days prior to ED presentation ad admission. She was 

seen in consultation by Dr Rudi Covarrubias, orthopedic surgeon with agreement on 

conservative medical management including pain control and rehabilitation. Her 

hospitalization was further compounded with excessive anticoagulation 

necessitating administration of Vitamin K and withholding her Coumadin 

administration. No noted or reported bleeding. Her INR eventually became 

subtherapeutic and she was started on Lovenox bridge therapy with restart of 

her Coumadin at 5 mg p.o qhs. Her Lovenox will be discontinue prior to transfer 

to SNF today and INR recheck on 02/27/2017 and thereafter weekly until INR is > 

2.0 with subsequent every 2 weeks to keep INR between 2.0and 3.0. Her Coumadin 

dose will be adjusted as necessary. Patient will need office visit follow up 

appointment made prior to discharge from SNF. She may benefit from Home Health 

Agency services upon discharge from SNF.





Physical Exam


Vital Signs: 


 











Temp Pulse Resp BP Pulse Ox


 


 98.2 F   74   15   119/44 L  96 


 


 02/24/17 03:36  02/24/17 07:00  02/24/17 03:36  02/24/17 03:36  02/24/17 03:36








 Intake & Output











 02/23/17 02/24/17 02/25/17





 06:59 06:59 06:59


 


Intake Total 1270 737 


 


Output Total 800 200 


 


Balance 470 537 


 


Weight 81.3 kg  











General appearance: PRESENT: no acute distress, cooperative, obese


Head exam: PRESENT: atraumatic, normocephalic


Eye exam: PRESENT: conjunctiva pink, EOMI, PERRLA.  ABSENT: scleral icterus


Ear exam: PRESENT: normal external ear exam


Mouth exam: PRESENT: moist, tongue midline


Teeth exam: PRESENT: poor dentation.  ABSENT: dental caries, dental tenderness, 

edentulous, other


Throat exam: ABSENT: post pharyngeal erythema, tonsillar erythema, tonsillar 

exudate, tonsillogmegaly, other


Neck exam: ABSENT: carotid bruit, JVD, lymphadenopathy, thyromegaly


Respiratory exam: PRESENT: clear to auscultation conrad.  ABSENT: rales, rhonchi, 

wheezes


Cardiovascular exam: PRESENT: systolic murmur.  ABSENT: bradycardia, clicks, 

diastolic murmur, gallop, irregular rhythm, RRR, rubs, +S1, +S2, tachycardia, 

other


Pulses: PRESENT: normal dorsalis pedis pul


Vascular exam: PRESENT: normal capillary refill


GI/Abdominal exam: PRESENT: normal bowel sounds, soft.  ABSENT: distended, 

guarding, mass, organolmegaly, rebound, tenderness


Extremities exam: PRESENT: full ROM.  ABSENT: calf tenderness, clubbing, joint 

swelling, pedal edema, tenderness, +1 edema, +2 edema, other


Musculoskeletal exam: PRESENT: ambulatory, deformity - related to joint 

involvment with arthritis, tenderness - sacral spine region due to sacral 

insufficiency fracture


Neurological exam: PRESENT: alert, awake, oriented to person, oriented to place

, oriented to time, oriented to situation, CN II-XII grossly intact.  ABSENT: 

motor sensory deficit


Psychiatric exam: PRESENT: appropriate affect, normal mood.  ABSENT: homicidal 

ideation, suicidal ideation


Skin exam: PRESENT: dry, intact, warm.  ABSENT: cyanosis, rash





Results


Laboratory Results: 


 





 02/24/17 04:17 





 02/17/17 03:41 





 











  02/24/17





  04:17


 


WBC  7.4


 


RBC  3.46 L


 


Hgb  10.7 L


 


Hct  32.7 L


 


MCV  95


 


MCH  31.0


 


MCHC  32.8


 


RDW  16.1 H


 


Plt Count  319











Impressions: 


 





Pelvis X-Ray  02/16/17 11:18


IMPRESSION:  No acute fracture or dislocation identified.  Severe degenerative 

changes noted in both hips.  Degenerative changes noted in the pubic symphysis 

and lumbar spine


 








Lumbar Spine MRI  02/16/17 12:39


IMPRESSION:  Acute/early subacute fractures of the sacrum with  edema and 

hemorrhage in the adjacent tissues and muscles as above.  Report discussed with 

the emergency room attending physician


 














Transfer Plan





- Disposition


Transfer Plan: 


For short term rehabilitation, pain management, anticoagulation management on 

Warfarin. Monitor PT/INR weekly until INR > 2.0 and thereafter every 2 weeks to 

keep INR between 2.0 and 3.0. Adjust Warfarin dose as necessary. Currently on 5 

mg p.o qhs.





- Time Spent with Patient


Time spent with patient: Greater than 30 Minutes - I had extensive bedside 

discussion with patient regarding SNF transfer for short term rehabilitation, 

expectations, duration of recovery, and need for participation in 

rehabilitative activities. All questions were adequately answered.





Qualifiers


**PATEINT BEING DISCHARGED WITH ANY OF THE FOLLOWING DIAGNOSIS?: No





Plan


Discharge Plan: 


For short term rehabilitation, pain management, anticoagulation management on 

Warfarin. Monitor PT/INR weekly until INR > 2.0 and thereafter every 2 weeks to 

keep INR between 2.0 and 3.0. Adjust Warfarin dose as necessary. Currently on 5 

mg po qhs. Call office for follow up appointment before discharge from SNF.


Time Spent: Greater than 30 Minutes - I had extensive discussion with patient 

at bedside regarding post hospital care at SNF, expectations with regard to 

recovery and recovery time, need for INR monitoring and office followup 

arrangement before discharge from SNF. All questions were adequately answered.

## 2017-11-22 ENCOUNTER — HOSPITAL ENCOUNTER (OUTPATIENT)
Dept: HOSPITAL 62 - WI | Age: 72
End: 2017-11-22
Attending: INTERNAL MEDICINE
Payer: MEDICARE

## 2017-11-22 DIAGNOSIS — Z12.31: Primary | ICD-10-CM

## 2017-11-22 PROCEDURE — 77067 SCR MAMMO BI INCL CAD: CPT

## 2017-11-22 PROCEDURE — 77063 BREAST TOMOSYNTHESIS BI: CPT

## 2017-11-22 PROCEDURE — G0202 SCR MAMMO BI INCL CAD: HCPCS

## 2019-01-09 ENCOUNTER — HOSPITAL ENCOUNTER (OUTPATIENT)
Dept: HOSPITAL 62 - WI | Age: 74
End: 2019-01-09
Attending: INTERNAL MEDICINE
Payer: MEDICARE

## 2019-01-09 DIAGNOSIS — Z12.31: Primary | ICD-10-CM

## 2019-01-09 PROCEDURE — 77067 SCR MAMMO BI INCL CAD: CPT

## 2019-01-09 PROCEDURE — 77063 BREAST TOMOSYNTHESIS BI: CPT

## 2019-01-10 NOTE — WOMENS IMAGING REPORT
EXAM DESCRIPTION:  3D SCREENING MAMMO BILAT



COMPLETED DATE/TIME:  1/9/2019 2:06 pm



REASON FOR STUDY:  ROUTINE BILATERAL SCREENING;Z12.31 Z12.31  ENCNTR SCREEN MAMMOGRAM FOR MALIGNANT N
EOPLASM OF LAUREN



COMPARISON:  11/22/2017 and 12/8/2015.



TECHNIQUE:  Standard craniocaudal and mediolateral oblique views of each breast recorded using digita
l acquisition and breast tomosynthesis.



LIMITATIONS:  None.



FINDINGS:  No masses, calcifications or architectural distortion. No areas of suspicion.

Read with the assistance of CAD.

.[facility] - R2 Cenova Version 1.3

.[facility] - R2 Cenova Version 1.3

.[facility] - R2 Cenova Version 2.4

.[facility] - R2 Cenova Version 2.4

.[facility] - R2  Version 9.2



IMPRESSION:  NORMAL MAMMOGRAM.  BIRADS 1.



BREAST DENSITY:  c. The breasts are heterogeneously dense, which may obscure small masses.



BIRAD:  1 NEGATIVE



RECOMMENDATION:  ROUTINE SCREENING



COMMENT:  The patient has been notified of the results by letter per SA requirements. Additional no
tification policies are in place for contacting patient with suspicious or incomplete findings.

Quality ID #225: The American College of Radiology recommends an annual screening mammogram for women
 aged 40 years or over. This facility utilizes a reminder system to ensure that all patients receive 
reminder letters, and/or direct phone calls for appointments. This includes reminders for routine scr
eening mammograms, diagnostic mammograms, or other Breast Imaging Interventions when appropriate.  Th
is patient will be placed in the appropriate reminder system.

The American College of Radiology (ACR) has developed recommendations for screening MRI of the breast
s in certain patient populations, to be used in conjunction with mammography.  Breast MRI surveillanc
e may be appropriate for women with more than 20% lifetime risk of developing breast cancer  as deter
mined by genetic testing, significant family history of the disease, or history of mantle radiation f
or Hodgkins Disease.  ACR Practice Guidelines 2008.

[employer]



PQRS 6045F: Fluoroscopic imaging is not utilized for breast tomosynthesis.



TECHNICAL DOCUMENTATION:  FINDING NUMBER: (1)

ASSESSMENT:  (1)

JOB ID:  [id number]

 2011 [employer]- All Rights Reserved



Reading location - IP/workstation name: [email or web address]

## 2019-06-04 ENCOUNTER — HOSPITAL ENCOUNTER (EMERGENCY)
Dept: HOSPITAL 62 - ER | Age: 74
End: 2019-06-04
Payer: MEDICARE

## 2019-06-04 VITALS — SYSTOLIC BLOOD PRESSURE: 151 MMHG | DIASTOLIC BLOOD PRESSURE: 13 MMHG

## 2019-06-04 DIAGNOSIS — I10: ICD-10-CM

## 2019-06-04 DIAGNOSIS — R06.00: ICD-10-CM

## 2019-06-04 DIAGNOSIS — I46.9: Primary | ICD-10-CM

## 2019-06-04 DIAGNOSIS — Z95.4: ICD-10-CM

## 2019-06-04 DIAGNOSIS — E11.9: ICD-10-CM

## 2019-06-04 PROCEDURE — 92950 HEART/LUNG RESUSCITATION CPR: CPT

## 2019-06-04 PROCEDURE — 96374 THER/PROPH/DIAG INJ IV PUSH: CPT

## 2019-06-04 PROCEDURE — 36680 INSERT NEEDLE BONE CAVITY: CPT

## 2019-06-04 PROCEDURE — 99291 CRITICAL CARE FIRST HOUR: CPT

## 2019-06-04 NOTE — ER DOCUMENT REPORT
ED General





- General


Chief Complaint: Cardiac Arrest


Stated Complaint: TROUBLE BREATHING


Time Seen by Provider: 19 22:44


TRAVEL OUTSIDE OF THE [place] IN LAST 30 DAYS: No





- HPI


Notes: 





Patient is a 73-year-old female that presents to the emergency department for 

chief complaint of cardiac arrest.


EMS was called to patient's house for agonal breathing and shortness of breath. 

EMS states that patient had an aortic valve replacement 2 weeks ago.  She has 

had increased shortness of breath over the last few days per family.  Patient 

did have her insulin dosing changed recently and they thought her increased 

somnolence state may be related to hypoglycemia.  EMS states her blood sugar was

over 200.  They states she was agonal breathing when they arrived on scene and 

was bradycardic.  Patient then went into asystole arrest.  Patient received 3 

rounds of CPR with 3 epinephrines prior to arrival in the ED.  EMS intubated 

with a 7.0 ET tube in the field.





Past Medical History: Diabetes, hypertension





Past Surgical History: Aortic valve replacement





Social History: Unknown





Family History: Reviewed and noncontributory for presenting illness





Allergies: Reviewed, see documented allergy list.








REVIEW OF SYSTEMS:


Unable to obtain because of acuity of condition








PHYSICAL EXAMINATION:





Vital signs reviewed, nursing noted reviewed.





GENERAL: Unresponsive, pale, cool dry 


Skin


HEAD: Atraumatic, normocephalic.





EYES: Pupils fixed mid position with no corneal reflex bilaterally





ENT: nares patent, oropharynx has copious secretions, dry mucous membranes.





NECK: Trachea midline





LUNGS: Apneic, breath sounds with BVM diminished to auscultation bilaterally and

equal.  





HEART: Pulseless





ABDOMEN: Protuberant, no pulsatile mass





EXTREMITIES: No long bone deformity or peripheral edema





NEUROLOGICAL: Unresponsive, GCS 3 T





SKIN: Cool, dry, midline surgical incision over sternum with  bleeding











Past Medical History





- Social History


Smoking Status: Unknown if Ever Smoked


Family History: Reviewed & Not Pertinent





Physical Exam





- Vital signs


Vitals: 





                                        











Temp Pulse Ox


 


 96.9 F L  9 L


 


 19 22:17  19 22:17














Course





- Re-evaluation


Re-evalutation: 





19 22:48


Patient presented by EMS unresponsive with CPR in progress.  She had a 7.0 ET 

tube placed in the field by EMS.  CPR was continued in the emergency room.  

Patient received calcium, bicarb, epinephrine, and IV fluids.  She continued to 

be in asystole throughout her entire resuscitation.  ET tube had copious amounts

of secretions that were blood-tinged. attempted to directly visualize ET tube 

placement to confirm appropriate placement, I was unable to visualize the ET 

tube because of the amount of secretions and bleeding in the oropharynx.  

Patient was appearing more cyanotic and ET tube was pulled for concern that it 

may have become dislodged during compressions.  Patient was ventilated using bag

valve mask. bedside ultrasound showed no cardiac or valvular movement.  There 

was no large pericardial effusion or cardiac tamponade.  Despite resuscitative 

efforts the patient was pronounced  at 2224.  Patient's son and 

daughter-in-law were present and informed.





- Vital Signs


Vital signs: 





                                        











Temp Pulse Resp BP Pulse Ox


 


 96.9 F L     25 H  151/13 H  27 L


 


 19 22:17     19 22:24  19 22:24  19 22:22














Procedures





- Ultrasound/Bedside


  ** Ultrasound/Bedside


Time completed: 22:23


Notes: 





19 22:52


Parasternal short axis and subxiphoid views obtained using cardiac probe.  No 

cardiac wall movement or valvular movement is visualized.  No large pericardial 

effusion or tamponade





- Additional Procedures


  ** IO insertion


Time performed: 22:20


Additional Procedures: IO insertion - Right pretibial.  Good blood return and 

flushed easily.  Secured in place with tape.  One attempt with no complications





Critical Care Note





- Critical Care Note


Total time excluding time spent on procedures (mins): 35


Comments: 





Critical care time  35 exclusive from separate billable procedures for a patient

requiring complex medical decision making, and high potential for clinical 

deterioration. Time spent obtaining history from patient or surrogate, 

discussions with consultants, development of treatment plan with patient or 

surrogate, evaluation of patient's response to treatment, examination of 

patient, ordering and performing treatments and interventions, ordering and 

review of laboratory studies, re-evaluation of patient's condition, ordering and

review of radiographic studies and review of old charts











Discharge





- Discharge


Clinical Impression: 


 Cardiac arrest





Disposition:

## 2025-06-17 NOTE — WOMENS IMAGING REPORT
EXAM DESCRIPTION:  3D SCREENING MAMMO BILAT



COMPLETED DATE/TIME:  11/22/2017 3:48 pm



REASON FOR STUDY:  ROUTINE SCREENING; Z12.31 Z12.31  ENCNTR SCREEN MAMMOGRAM FOR MALIGNANT NEOPLASM O
F LAUREN



COMPARISON:  Multiple since 2011



TECHNIQUE:  Standard craniocaudal and mediolateral oblique views of each breast recorded using digita
l acquisition and breast tomosynthesis.



LIMITATIONS:  None.



FINDINGS:  Findings present which are benign by mammographic criteria.  No suspicious masses, calcifi
cations or architectural distortion.

Pertinent benign findings: Benign bilateral breast parenchymal and arterial vascular calcifications.

Read with the assistance of CAD.

.[facility] - R2 Cenova Version 1.3

.[facility] - R2 Cenova Version 1.3

.[facility] - R2 Cenova Version 2.4

.[facility] [place] - R2 Cenova Version 2.4

.[facility] - R2  Version 9.2

Benign mammographic findings may include one or more of the following:  Smooth masses, popcorn/rim/co
arse calcifications, asymmetries, post-procedure changes, and lesions with long-standing stability.



IMPRESSION:  BENIGN MAMMOGRAPHIC FINDINGS.  BIRADS 2



BREAST DENSITY:  c. The breasts are heterogeneously dense, which may obscure small masses.



BIRAD:  2 BENIGN FINDING(S)



RECOMMENDATION:  RECOMMENDATION: ROUTINE SCREENING

Please continue yearly bilateral screening tomosynthesis in November 2018



COMMENT:  The patient has been notified of the results by letter per SA requirements. Additional no
tification policies are in place for contacting patient with suspicious or incomplete findings.

Quality ID #225: The American College of Radiology recommends an annual screening mammogram for women
 aged 40 years or over. This facility utilizes a reminder system to ensure that all patients receive 
reminder letters, and/or direct phone calls for appointments. This includes reminders for routine scr
eening mammograms, diagnostic mammograms, or other Breast Imaging Interventions when appropriate.  Th
is patient will be placed in the appropriate reminder system.

The American College of Radiology (ACR) has developed recommendations for screening MRI of the breast
s in certain patient populations, to be used in conjunction with mammography.  Breast MRI surveillanc
e may be appropriate for women with more than 20% lifetime risk of developing breast cancer  as deter
mined by genetic testing, significant family history of the disease, or history of mantle radiation f
or Hodgkins Disease.  ACR Practice Guidelines 2008.

[employer]



PQRS 6045F: Fluoroscopic imaging is not utilized for breast tomosynthesis.



TECHNICAL DOCUMENTATION:  FINDING NUMBER: (1)

ASSESSMENT: (1)

JOB ID:  [id number]

 2011 [employer]- All Rights Reserved Sooner DS for Watchman avail 7/21    Called pt and offered sooner DOS for procedure and rsch to 7/2    Pt aware, CND atiya notified    Appt with RN Delfina will also be moved to sooner DOS- will call pt with emeka BARAKAT